# Patient Record
Sex: MALE | Race: ASIAN | NOT HISPANIC OR LATINO | URBAN - METROPOLITAN AREA
[De-identification: names, ages, dates, MRNs, and addresses within clinical notes are randomized per-mention and may not be internally consistent; named-entity substitution may affect disease eponyms.]

---

## 2023-03-02 ENCOUNTER — INPATIENT (INPATIENT)
Facility: HOSPITAL | Age: 63
LOS: 1 days | Discharge: HOME | DRG: 64 | End: 2023-03-04
Attending: PSYCHIATRY & NEUROLOGY | Admitting: PSYCHIATRY & NEUROLOGY
Payer: COMMERCIAL

## 2023-03-02 VITALS
RESPIRATION RATE: 18 BRPM | TEMPERATURE: 98 F | OXYGEN SATURATION: 100 % | DIASTOLIC BLOOD PRESSURE: 101 MMHG | SYSTOLIC BLOOD PRESSURE: 181 MMHG | HEART RATE: 82 BPM

## 2023-03-02 DIAGNOSIS — I61.9 NONTRAUMATIC INTRACEREBRAL HEMORRHAGE, UNSPECIFIED: ICD-10-CM

## 2023-03-02 DIAGNOSIS — I63.9 CEREBRAL INFARCTION, UNSPECIFIED: ICD-10-CM

## 2023-03-02 DIAGNOSIS — E78.5 HYPERLIPIDEMIA, UNSPECIFIED: ICD-10-CM

## 2023-03-02 DIAGNOSIS — I10 ESSENTIAL (PRIMARY) HYPERTENSION: ICD-10-CM

## 2023-03-02 DIAGNOSIS — C64.9 MALIGNANT NEOPLASM OF UNSPECIFIED KIDNEY, EXCEPT RENAL PELVIS: ICD-10-CM

## 2023-03-02 DIAGNOSIS — Z90.5 ACQUIRED ABSENCE OF KIDNEY: Chronic | ICD-10-CM

## 2023-03-02 LAB
A1C WITH ESTIMATED AVERAGE GLUCOSE RESULT: 5.6 % — SIGNIFICANT CHANGE UP (ref 4–5.6)
ALBUMIN SERPL ELPH-MCNC: 4.7 G/DL — SIGNIFICANT CHANGE UP (ref 3.3–5)
ALP SERPL-CCNC: 65 U/L — SIGNIFICANT CHANGE UP (ref 40–120)
ALT FLD-CCNC: 19 U/L — SIGNIFICANT CHANGE UP (ref 10–45)
ANION GAP SERPL CALC-SCNC: 14 MMOL/L — SIGNIFICANT CHANGE UP (ref 5–17)
ANION GAP SERPL CALC-SCNC: 15 MMOL/L — SIGNIFICANT CHANGE UP (ref 5–17)
APTT BLD: 35.2 SEC — SIGNIFICANT CHANGE UP (ref 27.5–35.5)
AST SERPL-CCNC: 21 U/L — SIGNIFICANT CHANGE UP (ref 10–40)
BASOPHILS # BLD AUTO: 0.02 K/UL — SIGNIFICANT CHANGE UP (ref 0–0.2)
BASOPHILS NFR BLD AUTO: 0.4 % — SIGNIFICANT CHANGE UP (ref 0–2)
BILIRUB SERPL-MCNC: 0.6 MG/DL — SIGNIFICANT CHANGE UP (ref 0.2–1.2)
BUN SERPL-MCNC: 23 MG/DL — SIGNIFICANT CHANGE UP (ref 7–23)
BUN SERPL-MCNC: 27 MG/DL — HIGH (ref 7–23)
CALCIUM SERPL-MCNC: 9.2 MG/DL — SIGNIFICANT CHANGE UP (ref 8.4–10.5)
CALCIUM SERPL-MCNC: 9.7 MG/DL — SIGNIFICANT CHANGE UP (ref 8.4–10.5)
CHLORIDE SERPL-SCNC: 106 MMOL/L — SIGNIFICANT CHANGE UP (ref 96–108)
CHLORIDE SERPL-SCNC: 107 MMOL/L — SIGNIFICANT CHANGE UP (ref 96–108)
CHOLEST SERPL-MCNC: 130 MG/DL — SIGNIFICANT CHANGE UP
CO2 SERPL-SCNC: 18 MMOL/L — LOW (ref 22–31)
CO2 SERPL-SCNC: 22 MMOL/L — SIGNIFICANT CHANGE UP (ref 22–31)
CREAT SERPL-MCNC: 1.3 MG/DL — SIGNIFICANT CHANGE UP (ref 0.5–1.3)
CREAT SERPL-MCNC: 1.49 MG/DL — HIGH (ref 0.5–1.3)
CRP SERPL-MCNC: <3 MG/L — SIGNIFICANT CHANGE UP (ref 0–4)
EGFR: 53 ML/MIN/1.73M2 — LOW
EGFR: 62 ML/MIN/1.73M2 — SIGNIFICANT CHANGE UP
EOSINOPHIL # BLD AUTO: 0.13 K/UL — SIGNIFICANT CHANGE UP (ref 0–0.5)
EOSINOPHIL NFR BLD AUTO: 2.8 % — SIGNIFICANT CHANGE UP (ref 0–6)
ERYTHROCYTE [SEDIMENTATION RATE] IN BLOOD: 9 MM/HR — SIGNIFICANT CHANGE UP (ref 0–20)
ESTIMATED AVERAGE GLUCOSE: 114 MG/DL — SIGNIFICANT CHANGE UP (ref 68–114)
GLUCOSE SERPL-MCNC: 110 MG/DL — HIGH (ref 70–99)
GLUCOSE SERPL-MCNC: 97 MG/DL — SIGNIFICANT CHANGE UP (ref 70–99)
HCT VFR BLD CALC: 42.4 % — SIGNIFICANT CHANGE UP (ref 39–50)
HCT VFR BLD CALC: 44.6 % — SIGNIFICANT CHANGE UP (ref 39–50)
HDLC SERPL-MCNC: 52 MG/DL — SIGNIFICANT CHANGE UP
HGB BLD-MCNC: 13.6 G/DL — SIGNIFICANT CHANGE UP (ref 13–17)
HGB BLD-MCNC: 14.3 G/DL — SIGNIFICANT CHANGE UP (ref 13–17)
IMM GRANULOCYTES NFR BLD AUTO: 0.2 % — SIGNIFICANT CHANGE UP (ref 0–0.9)
INR BLD: 0.95 RATIO — SIGNIFICANT CHANGE UP (ref 0.88–1.16)
LIPID PNL WITH DIRECT LDL SERPL: 66 MG/DL — SIGNIFICANT CHANGE UP
LYMPHOCYTES # BLD AUTO: 2.07 K/UL — SIGNIFICANT CHANGE UP (ref 1–3.3)
LYMPHOCYTES # BLD AUTO: 43.9 % — SIGNIFICANT CHANGE UP (ref 13–44)
MCHC RBC-ENTMCNC: 30.5 PG — SIGNIFICANT CHANGE UP (ref 27–34)
MCHC RBC-ENTMCNC: 30.8 PG — SIGNIFICANT CHANGE UP (ref 27–34)
MCHC RBC-ENTMCNC: 32.1 GM/DL — SIGNIFICANT CHANGE UP (ref 32–36)
MCHC RBC-ENTMCNC: 32.1 GM/DL — SIGNIFICANT CHANGE UP (ref 32–36)
MCV RBC AUTO: 95.1 FL — SIGNIFICANT CHANGE UP (ref 80–100)
MCV RBC AUTO: 95.9 FL — SIGNIFICANT CHANGE UP (ref 80–100)
MONOCYTES # BLD AUTO: 0.45 K/UL — SIGNIFICANT CHANGE UP (ref 0–0.9)
MONOCYTES NFR BLD AUTO: 9.5 % — SIGNIFICANT CHANGE UP (ref 2–14)
NEUTROPHILS # BLD AUTO: 2.04 K/UL — SIGNIFICANT CHANGE UP (ref 1.8–7.4)
NEUTROPHILS NFR BLD AUTO: 43.2 % — SIGNIFICANT CHANGE UP (ref 43–77)
NON HDL CHOLESTEROL: 77 MG/DL — SIGNIFICANT CHANGE UP
NRBC # BLD: 0 /100 WBCS — SIGNIFICANT CHANGE UP (ref 0–0)
NRBC # BLD: 0 /100 WBCS — SIGNIFICANT CHANGE UP (ref 0–0)
PLATELET # BLD AUTO: 171 K/UL — SIGNIFICANT CHANGE UP (ref 150–400)
PLATELET # BLD AUTO: 185 K/UL — SIGNIFICANT CHANGE UP (ref 150–400)
POTASSIUM SERPL-MCNC: 3.7 MMOL/L — SIGNIFICANT CHANGE UP (ref 3.5–5.3)
POTASSIUM SERPL-MCNC: 4.1 MMOL/L — SIGNIFICANT CHANGE UP (ref 3.5–5.3)
POTASSIUM SERPL-SCNC: 3.7 MMOL/L — SIGNIFICANT CHANGE UP (ref 3.5–5.3)
POTASSIUM SERPL-SCNC: 4.1 MMOL/L — SIGNIFICANT CHANGE UP (ref 3.5–5.3)
PROT SERPL-MCNC: 7.5 G/DL — SIGNIFICANT CHANGE UP (ref 6–8.3)
PROTHROM AB SERPL-ACNC: 11 SEC — SIGNIFICANT CHANGE UP (ref 10.5–13.4)
RBC # BLD: 4.42 M/UL — SIGNIFICANT CHANGE UP (ref 4.2–5.8)
RBC # BLD: 4.69 M/UL — SIGNIFICANT CHANGE UP (ref 4.2–5.8)
RBC # FLD: 13 % — SIGNIFICANT CHANGE UP (ref 10.3–14.5)
RBC # FLD: 13.2 % — SIGNIFICANT CHANGE UP (ref 10.3–14.5)
SARS-COV-2 RNA SPEC QL NAA+PROBE: SIGNIFICANT CHANGE UP
SODIUM SERPL-SCNC: 140 MMOL/L — SIGNIFICANT CHANGE UP (ref 135–145)
SODIUM SERPL-SCNC: 142 MMOL/L — SIGNIFICANT CHANGE UP (ref 135–145)
TRIGL SERPL-MCNC: 55 MG/DL — SIGNIFICANT CHANGE UP
TROPONIN T, HIGH SENSITIVITY RESULT: 23 NG/L — SIGNIFICANT CHANGE UP (ref 0–51)
WBC # BLD: 4.72 K/UL — SIGNIFICANT CHANGE UP (ref 3.8–10.5)
WBC # BLD: 6.33 K/UL — SIGNIFICANT CHANGE UP (ref 3.8–10.5)
WBC # FLD AUTO: 4.72 K/UL — SIGNIFICANT CHANGE UP (ref 3.8–10.5)
WBC # FLD AUTO: 6.33 K/UL — SIGNIFICANT CHANGE UP (ref 3.8–10.5)

## 2023-03-02 PROCEDURE — 70496 CT ANGIOGRAPHY HEAD: CPT | Mod: 26,MA

## 2023-03-02 PROCEDURE — 70553 MRI BRAIN STEM W/O & W/DYE: CPT | Mod: 26

## 2023-03-02 PROCEDURE — 99223 1ST HOSP IP/OBS HIGH 75: CPT

## 2023-03-02 PROCEDURE — 70450 CT HEAD/BRAIN W/O DYE: CPT | Mod: 26

## 2023-03-02 PROCEDURE — 0042T: CPT | Mod: MA

## 2023-03-02 PROCEDURE — 99291 CRITICAL CARE FIRST HOUR: CPT

## 2023-03-02 PROCEDURE — 70498 CT ANGIOGRAPHY NECK: CPT | Mod: 26,MA

## 2023-03-02 RX ORDER — SODIUM CHLORIDE 9 MG/ML
1000 INJECTION, SOLUTION INTRAVENOUS ONCE
Refills: 0 | Status: COMPLETED | OUTPATIENT
Start: 2023-03-02 | End: 2023-03-02

## 2023-03-02 RX ORDER — INFLUENZA VIRUS VACCINE 15; 15; 15; 15 UG/.5ML; UG/.5ML; UG/.5ML; UG/.5ML
0.5 SUSPENSION INTRAMUSCULAR ONCE
Refills: 0 | Status: DISCONTINUED | OUTPATIENT
Start: 2023-03-02 | End: 2023-03-04

## 2023-03-02 RX ORDER — AMLODIPINE BESYLATE 2.5 MG/1
2.5 TABLET ORAL DAILY
Refills: 0 | Status: DISCONTINUED | OUTPATIENT
Start: 2023-03-02 | End: 2023-03-04

## 2023-03-02 RX ORDER — NICARDIPINE HYDROCHLORIDE 30 MG/1
5 CAPSULE, EXTENDED RELEASE ORAL
Qty: 40 | Refills: 0 | Status: DISCONTINUED | OUTPATIENT
Start: 2023-03-02 | End: 2023-03-02

## 2023-03-02 RX ORDER — ATORVASTATIN CALCIUM 80 MG/1
10 TABLET, FILM COATED ORAL AT BEDTIME
Refills: 0 | Status: DISCONTINUED | OUTPATIENT
Start: 2023-03-02 | End: 2023-03-04

## 2023-03-02 RX ADMIN — NICARDIPINE HYDROCHLORIDE 25 MG/HR: 30 CAPSULE, EXTENDED RELEASE ORAL at 02:39

## 2023-03-02 RX ADMIN — AMLODIPINE BESYLATE 2.5 MILLIGRAM(S): 2.5 TABLET ORAL at 10:19

## 2023-03-02 RX ADMIN — SODIUM CHLORIDE 1000 MILLILITER(S): 9 INJECTION, SOLUTION INTRAVENOUS at 03:01

## 2023-03-02 RX ADMIN — ATORVASTATIN CALCIUM 10 MILLIGRAM(S): 80 TABLET, FILM COATED ORAL at 21:25

## 2023-03-02 RX ADMIN — NICARDIPINE HYDROCHLORIDE 25 MG/HR: 30 CAPSULE, EXTENDED RELEASE ORAL at 04:55

## 2023-03-02 NOTE — PHYSICAL THERAPY INITIAL EVALUATION ADULT - PLANNED THERAPY INTERVENTIONS, PT EVAL
GOAL: patient will be able to negotiate 10  stairs (I) with one HR in 2 weeks/balance training/gait training/strengthening/transfer training

## 2023-03-02 NOTE — PHYSICAL THERAPY INITIAL EVALUATION ADULT - PREDICTED DURATION OF THERAPY (DAYS/WKS), PT EVAL
2 weeks Duration Of Freeze Thaw-Cycle (Seconds): 0 Render Note In Bullet Format When Appropriate: No Consent: The patient's consent was obtained including but not limited to risks of crusting, scabbing, blistering, scarring, darker or lighter pigmentary change, recurrence, incomplete removal and infection. Detail Level: Detailed Post-Care Instructions: I reviewed with the patient in detail post-care instructions. Patient is to wear sunprotection, and avoid picking at any of the treated lesions. Pt may apply Vaseline to crusted or scabbing areas. Number Of Freeze-Thaw Cycles: 1 freeze-thaw cycle

## 2023-03-02 NOTE — OCCUPATIONAL THERAPY INITIAL EVALUATION ADULT - DIAGNOSIS, OT EVAL
Pt with mild deficits in dynamic standing balance impacting independence in higher level adl's Ftsg Text: The defect edges were debeveled with a #15 scalpel blade.  Given the location of the defect, shape of the defect and the proximity to free margins a full thickness skin graft was deemed most appropriate.  Using a sterile surgical marker, the primary defect shape was transferred to the donor site. The area thus outlined was incised deep to adipose tissue with a #15 scalpel blade.  The harvested graft was then trimmed of adipose tissue until only dermis and epidermis was left.  The skin margins of the secondary defect were undermined to an appropriate distance in all directions utilizing iris scissors.  The secondary defect was closed with interrupted buried subcutaneous sutures.  The skin edges were then re-apposed with running  sutures.  The skin graft was then placed in the primary defect and oriented appropriately.

## 2023-03-02 NOTE — PATIENT PROFILE ADULT - FUNCTIONAL ASSESSMENT - BASIC MOBILITY 6.
4-calculated by average/Not able to assess (calculate score using [facility] averaging method)

## 2023-03-02 NOTE — ED PROVIDER NOTE - OBJECTIVE STATEMENT
62-year-old male with a past medical history of prior stroke, HTN presents to the ED with acute numbness and facial droop that started proximately midnight.  Patient did not go to sleep and had symptoms started while he was awake.  He endorses weakness in the left lower extremity and he states that his left lower leg "feels heavier "compared to the other side.  He denies any headaches, fevers, chills, nausea, vomiting, diarrhea.  Usual state of health prior to onset of symptoms.  He takes aspirin and atorvastatin daily.  Code stroke was called from the triage area and immediately evaluated at CT scanner

## 2023-03-02 NOTE — ED PROVIDER NOTE - PHYSICAL EXAMINATION
GENERAL: no acute distress, non-toxic appearing  HEAD: normocephalic, atraumatic  HEENT: normal conjunctiva, oral mucosa moist, neck supple  CARDIAC: regular rate and rhythm, normal S1 and S2,  no appreciable murmurs  PULM: clear to ascultation bilaterally, no crackles, rales, rhonchi, or wheezing  GI: abdomen nondistended, soft, nontender, no guarding or rebound tenderness  : no CVA tenderness, no suprapubic tenderness    NEURO: alert and oriented x 3, normal speech, PERRLA, EOMI, no focal motor, nonantalgic gait, Right-sided nasolabial fold flattening, left-sided upper and lower extremity generalized numbness    MSK: no visible deformities, no peripheral edema, calf tenderness/redness/swelling  SKIN: no visible rashes, dry, well-perfused  PSYCH: appropriate mood and affect

## 2023-03-02 NOTE — ED PROVIDER NOTE - ATTENDING CONTRIBUTION TO CARE
MD Reinoso:  patient seen and evaluated personally.   I agree with the History & Physical,  Impression & Plan other than what was detailed in my note.  MD Reinoso  hx obtained via pt and daughter with resident and daughter translating, attempted to get  for mandarin as well  63 y/o male hx of stroke, no reported deficitis, lnk was t 12 am, at that time developed acute onset numbness on left face/arm/leg, states also feels weakness mildly, no cp, sob, f/c, no ha, bv, bg wnl, vitals stable, pt called stroke alert evaluated at ct scan, pt w/ no focal deficits, no facial droop, axox3, power 5/5x 4, no cn deficits, no pronator drift, given report of symptoms ct cta w/ perfusion ordered, will re eval, appreciate neuro recs. pt currently well appearing in no distress. MD Reinoso:  patient seen and evaluated personally.   I agree with the History & Physical,  Impression & Plan other than what was detailed in my note.  MD Reinoso  hx obtained via pt and daughter with resident and daughter translating, attempted to get  for mandarin as well  61 y/o male hx of stroke, no reported deficitis, lnk was t 12 am, at that time developed acute onset numbness on left face/arm/leg, states also feels weakness mildly, no cp, sob, f/c, no ha, bv, bg wnl, vitals stable, pt called stroke alert evaluated at ct scan, pt w/ no focal deficits, no facial droop, maybe some slight nl fold on r,  axox3, power 5/5x 4, no other cn deficits, no pronator drift, given report of symptoms ct cta w/ perfusion ordered, will re eval, appreciate neuro recs. pt currently well appearing in no distress.

## 2023-03-02 NOTE — PHYSICAL THERAPY INITIAL EVALUATION ADULT - PERTINENT HX OF CURRENT PROBLEM, REHAB EVAL
61 y/o RH male PMHx kidney cancer (now has one kidney), prior stroke (unknown residual deficits), HTN presenting with left temporal, LUE, LLE numbness and heaviness, LKW 3/2/23 00;00. Sx started abruptly. Patient denies HA, vision changes, dizziness. Patient visiting from [place]. Takes ASA 81 mg daily and Lipitor 10 mg daily. CT brain stroke protocol 01/02/23 Hyperdensity within the right posterior limb of the internal capsule suggestive of acute intraparenchymal hemorrhage. No significant mass   effect or midline shift. Punctate faint hyperattenuation in the anterior frontal region may   reflect tiny mineralization, minimal hemorrhage, or underlying lesion such as cavernous malformation. CT angiography neck 01/02/23: No hemodynamically significant stenosis by NASCET criteria. No vascular dissection. CT angiography brain: Right posterior cerebral artery markedly diminutive and irregular possibly reflecting vasospasm, vasculitis, or possible stenosis. Mild luminal irregularity of the left proximal posterior cerebral artery. Otherwise no major vessel occlusion or proximal stenosis. No evidence of aneurysm or other vascular malformation. CT perfusion 01/02/23: No core infarct or at risk territory.

## 2023-03-02 NOTE — H&P ADULT - TIME BILLING
I had an extensive conversation with patient and son via  Mandarin.  In summary patient has history of renal cancer in remission after surgery about 5 years ago. He was told that there are some nodules that are inflammatory but they have not changed and the cancer hasn't spread. He has history of 2 prior ischemic strokes one what seems retinal artery occlusion that left him blind in the right eye and another one that he can not recall the symptoms but his doctor in [place] had explained to them that after the surgery he was placed on a medication for his cancer and that the medication might contribute to a hypercoagulable state which was deemed etiology of the stroke. Patient is currently on aspirin and atorvastatin.   Now he presents with left side numbness and feeling heaviness of the left leg. The CT of the brain reveals a small thalamocapsular hemorrrhage.   Etiology -Hypertension: important to clarify that patient has no history of long standing high blood pressure, for which less likely in this setting.   Underlying brain lesion as cavernoma or  mass-will evaluate with MR of the brain  Vascular arteriovenous shunting unlikely and not evidence in CT angiogram for which will consider cerebral angiography.

## 2023-03-02 NOTE — OCCUPATIONAL THERAPY INITIAL EVALUATION ADULT - ADDITIONAL COMMENTS
Pt lives with spouse in [place]. Here visiting his son. will be returning to his sons house 2ste, 1 fos to bedroom, +stall shower, PTA pt was independent in all adl's and functional mobility.

## 2023-03-02 NOTE — ED PROVIDER NOTE - CLINICAL SUMMARY MEDICAL DECISION MAKING FREE TEXT BOX
62-year-old male with a past medical history of prior stroke, HTN presents to the ED with acute numbness and facial droop that started proximately midnight. Initial vitals notable for hypertension, rest of vitals otherwise nonactionable.  Breathing comfortably at bedside.  Patient seen ambulating at bedside without difficulty.  Patient is endorsing left sided sensory deficits throughout the left upper and left lower extremities.  There is a right-sided find nasolabial fold.  Differential diagnosis includes but not limited to embolic/thrombotic stroke versus hemorrhagic stroke.  Neurology at bedside evaluating patient.  Will order labs, EKG, meds, imaging, and reassess.

## 2023-03-02 NOTE — H&P ADULT - ASSESSMENT
63 y/o RH male PMHx kidney cancer (now has one kidney), prior stroke (unknown residual deficits), HTN presenting with left temporal, LUE, LLE numbness and heaviness, LKW 3/2/23 00;00. Sx started abruptly. Patient denies HA, vision changes, dizziness. Patient visiting from [place]. Takes ASA 81 mg daily and Lipitor 10 mg daily.     NIHSS:2, for mild facial asymmetry and sensory deficits  premrs;0  not a tenecteplase candidate given hemorrhage on CTH.  not a thrombectomy given no LVO.     Impression: Left sided sensorimotor deficits 2/2 right PLIC IPH likely iso HTN.     Recommendations:  [] NPO unless passess dysphagia screen; otherwise, S/S eval.   [] MRI brain w/w/o contrast   [] Repeat CTH in 4H and 24H (unless MRI brain done instead)   [] BP Goal: <140/90   [] Mechanical DVT ppx; can start pharmacologic DVT ppx in 24 hours if scans stable  [] Hold all antiplatelets and anticoagulants; Time to resume dependent on repeat imaging   [] Avoid hyponatremia; goal Na 140-150; No need for hypertonic saline or osmotic agents at this time   [] PT/OT    Case d/w stroke fellow under supervision of stroke attending.  63 y/o RH male PMHx kidney cancer (now has one kidney), prior stroke (unknown residual deficits), HTN presenting with left temporal, LUE, LLE numbness and heaviness, LKW 3/2/23 00;00. Sx started abruptly. Patient denies HA, vision changes, dizziness. Patient visiting from [place]. Takes ASA 81 mg daily and Lipitor 10 mg daily.     NIHSS:2, for mild facial asymmetry and sensory deficits  premrs;0  ICH:0  not a tenecteplase candidate given hemorrhage on CTH.  not a thrombectomy given no LVO.     Impression: Left sided sensorimotor deficits 2/2 right PLIC IPH likely iso HTN.     Recommendations:  [] NPO unless passess dysphagia screen; otherwise, S/S eval.   [] MRI brain w/w/o contrast   [] Repeat CTH in 4H and 24H (unless MRI brain done instead)   [] BP Goal: <140/90   [] Mechanical DVT ppx; can start pharmacologic DVT ppx in 24 hours if scans stable  [] Hold all antiplatelets and anticoagulants; Time to resume dependent on repeat imaging   [] Avoid hyponatremia; goal Na 140-150; No need for hypertonic saline or osmotic agents at this time   [] PT/OT    Case d/w stroke fellow under supervision of stroke attending.  63 y/o RH male PMHx kidney cancer (now has one kidney), prior stroke (unknown residual deficits), HTN presenting with left temporal, LUE, LLE numbness and heaviness, LKW 3/2/23 00;00. Sx started abruptly. Patient denies HA, vision changes, dizziness. Patient visiting from [place]. Takes ASA 81 mg daily and Lipitor 10 mg daily.     NIHSS:2, for mild facial asymmetry and sensory deficits  premrs;0  ICH:0  not a tenecteplase candidate given hemorrhage on CTH.  not a thrombectomy given no LVO.     Impression: Left sided sensorimotor deficits 2/2 right PLIC/thalamic IPH likely iso HTN.     Recommendations:  [] NPO unless passess dysphagia screen; otherwise, S/S eval.   [] MRI brain w/w/o contrast   [] Repeat CTH in 4H and 24H (unless MRI brain done instead)   [] BP Goal: <140/90   [] Mechanical DVT ppx; can start pharmacologic DVT ppx in 24 hours if scans stable  [] Hold all antiplatelets and anticoagulants; Time to resume dependent on repeat imaging   [] Avoid hyponatremia; goal Na 140-150; No need for hypertonic saline or osmotic agents at this time   [] PT/OT    Case d/w stroke fellow under supervision of stroke attending.  63 y/o RH male PMHx kidney cancer (now has one kidney), prior stroke (unknown residual deficits), HTN presenting with left temporal, LUE, LLE numbness and heaviness, LKW 3/2/23 00;00. Sx started abruptly. Patient denies HA, vision changes, dizziness. Patient visiting from [place]. Takes ASA 81 mg daily and Lipitor 10 mg daily.     NIHSS:2  premrs;0  ICH:0  not a tenecteplase candidate given hemorrhage on CTH.  not a thrombectomy given no LVO.     Impression: Left sided sensorimotor deficits 2/2 right PLIC/thalamic IPH likely iso HTN.     Recommendations:  [] NPO unless passess dysphagia screen; otherwise, S/S eval.   [] MRI brain w/w/o contrast   [] Repeat CTH in 4H and 24H (unless MRI brain done instead)   [] BP Goal: <140/90   [] Mechanical DVT ppx; can start pharmacologic DVT ppx in 24 hours if scans stable  [] Hold all antiplatelets and anticoagulants; Time to resume dependent on repeat imaging   [] Avoid hyponatremia; goal Na 140-150; No need for hypertonic saline or osmotic agents at this time   [] PT/OT    Case d/w stroke fellow under supervision of stroke attending.

## 2023-03-02 NOTE — PHYSICAL THERAPY INITIAL EVALUATION ADULT - ACTIVE RANGE OF MOTION EXAMINATION, REHAB EVAL
marko. upper extremity Active ROM was WNL (within normal limits)/bilateral lower extremity Active ROM was WNL (within normal limits)

## 2023-03-02 NOTE — PHYSICAL THERAPY INITIAL EVALUATION ADULT - NSPTDISCHREC_GEN_A_CORE
Home with outpatient physical therapy increase strength, balance, and endurance to improve all functional mobility./Outpatient PT

## 2023-03-02 NOTE — PATIENT PROFILE ADULT - FALL HARM RISK - HARM RISK INTERVENTIONS

## 2023-03-02 NOTE — ED PROVIDER NOTE - PROGRESS NOTE DETAILS
Pt resting comfortably on the cart, no acute distress, VSS, call light within reach   Patient was revealed to have CKD and a solitary kidney.  Risk and benefits of CT contrast study were explained to the patient.  Given acute left-sided numbness and facial droop it would be more beneficial to obtain CT scans to evaluate for stroke then risk of contrast-induced nephropathy.  Risk benefits were explained to the patient and family member at bedside.  Agreed to continue with contrast study. Attending Masom:  pt has intraparenchymal bleed, neuro recommending admission to them, will repeat bp, recommending keep less than 140.

## 2023-03-02 NOTE — ED ADULT NURSE NOTE - NSFALLRSKASSESASSIST_ED_ALL_ED
Lt message appt on 12/17/20 will need to be donna to 12/21/20 at 11am per . advised to contact office if he had any questions. Bri   
no

## 2023-03-02 NOTE — PATIENT PROFILE ADULT - DOES PATIENT HAVE ADVANCE DIRECTIVE
Expiration Date For Kenalog (Optional): 05/2023 Total Volume (Ccs): 0.6 Include Z78.9 (Other Specified Conditions Influencing Health Status) As An Associated Diagnosis?: No Lot # For Kenalog (Optional): [id number] No Concentration Of Kenalog Solution Injected (Mg/Ml): 20.0 Consent: The risks of atrophy were reviewed with the patient. Medical Necessity Clause: This procedure was medically necessary because the lesions that were treated were: Validate Note Data When Using Inventory: Yes Detail Level: Detailed X Size Of Lesion In Cm (Optional): 0 Kenalog Preparation: Kenalog with 2% lidocaine without epinephrine Treatment Number (Optional): 2 Administered By (Optional): Georgia Chamorro PA-C

## 2023-03-02 NOTE — ED ADULT TRIAGE NOTE - CHIEF COMPLAINT QUOTE
L. sided UE/LE numbness starting at midnight, Mandarin speaking daughter at bedside L. sided UE/LE and face numbness starting at midnight, Mandarin speaking daughter at bedside

## 2023-03-02 NOTE — H&P ADULT - NSHPPHYSICALEXAM_GEN_ALL_CORE
Neurological Exam:  Mental Status: Orientated to self, date and place.  Attention intact.  No dysarthria. Speech fluent.  Cranial Nerves:   PERRL, EOMI, VFF, no nystagmus.    CN V1-3 intact to light touch .  No facial asymmetry.  Hearing intact to finger rub bilaterally.  Tongue, uvula and palate midline.  Sternocleidomastoid and Trapezius intact bilaterally.    Motor:   Tone: normal.                  Strength:     [] Upper extremity                      Delt       Bicep    Tricep                                                  R         5/5 5/5 5/5 5/5                                               L          5/5 5/5 5/5 5/5  [] Lower extremity                       HF          KE          KF        DF         PF                                               R        5/5 5/5 5/5 5/5 5/5                                               L         4/5 4/5 4/5 5/5 5/5  Pronator drift: none                 Dysmetria: None to finger-nose-finger   No truncal ataxia.    Tremor: No resting, postural or action tremor.  No myoclonus.    Sensation: decreased to light touch and pinprick in LUE and LLE.     Gait: Drags left foot Neurological Exam:  Mental Status: Orientated to self, date and place.  Attention intact.  No dysarthria. Speech fluent.  Cranial Nerves:   PERRL, EOMI, VFF except baseline blindness in right eye, no nystagmus.    CN V1-3 intact to light touch .  No facial asymmetry.  Hearing intact to finger rub bilaterally.  Tongue, uvula and palate midline.  Sternocleidomastoid and Trapezius intact bilaterally.    Motor:   Tone: normal.                  Strength:     [] Upper extremity                      Delt       Bicep    Tricep                                                  R         5/5 5/5 5/5 5/5                                               L          5/5 5/5 5/5 5/5  [] Lower extremity                       HF          KE          KF        DF         PF                                               R        5/5 5/5 5/5 5/5 5/5                                               L         4/5 4/5 4/5 5/5        5/5  Pronator drift: none                 Dysmetria: None to finger-nose-finger   No truncal ataxia.    Tremor: No resting, postural or action tremor.  No myoclonus.    Sensation: decreased to light touch and pinprick in LUE and LLE.     Gait: Drags left foot

## 2023-03-02 NOTE — H&P ADULT - NSHPLABSRESULTS_GEN_ALL_CORE
< from: CT Brain Stroke Protocol (03.02.23 @ 01:51) >    Hyperdensity within the right posterior limb of the internal capsule   suggestive of acute intraparenchymal hemorrhage. No significant mass   effect or midline shift.    Punctate faint hyperattenuation in the anterior frontal region may   reflect tiny mineralization, minimal hemorrhage, or underlying lesion   such as cavernous malformation.    < end of copied text >    < from: CT Brain Perfusion Maps Stroke (03.02.23 @ 01:58) >    CT angiography neck: No hemodynamically significant stenosis by NASCET   criteria. No vascular dissection.    CT angiography brain: Right posterior cerebral artery markedly diminutive   and irregular possibly reflecting vasospasm, vasculitis, or possible   stenosis. Mild luminal irregularity of the left proximal posterior   cerebral artery. Otherwise no major vessel occlusion or proximal   stenosis. No evidence of aneurysm or other vascular malformation.    CT perfusion: No core infarct or at risk territory.    < end of copied text >

## 2023-03-02 NOTE — CONSULT NOTE ADULT - PROBLEM SELECTOR RECOMMENDATION 2
BP goal <140/90  s/p aav gtt - d/c'd this am   start amlodipine 5mg PO daily when needed  continue to monitor

## 2023-03-02 NOTE — H&P ADULT - HISTORY OF PRESENT ILLNESS
63 y/o RH male PMHx kidney cancer (now has one kidney), prior stroke (unknown residual deficits), HTN presenting with left temporal, LUE, LLE numbness and heaviness, LKW 3/2/23 00;00. Sx started abruptly. Patient denies HA, vision changes, dizziness. Patient visiting from [place]. Takes ASA 81 mg daily and Lipitor 10 mg daily.     NIHSS:2, for mild facial asymmetry and sensory deficits  premrs;0  not a tenecteplase candidate given hemorrhage on CTH.  not a thrombectomy given no LVO.  63 y/o RH male PMHx kidney cancer (now has one kidney), prior stroke (unknown residual deficits), HTN presenting with left temporal, LUE, LLE numbness and heaviness, LKW 3/2/23 00;00. Sx started abruptly. Patient denies HA, vision changes, dizziness. Patient visiting from [place]. Takes ASA 81 mg daily and Lipitor 10 mg daily.     NIHSS:2, for mild facial asymmetry and sensory deficits  premrs;0  ICH:0  not a tenecteplase candidate given hemorrhage on CTH.  not a thrombectomy given no LVO.  63 y/o RH male PMHx kidney cancer (now has one kidney), prior stroke (unknown residual deficits), HTN presenting with left temporal, LUE, LLE numbness and heaviness, LKW 3/2/23 00;00. Sx started abruptly. Patient denies HA, vision changes, dizziness. Patient visiting from [place]. Takes ASA 81 mg daily and Lipitor 10 mg daily.     NIHSS:3, for mild facial asymmetry and sensory deficits and baseline right visual difficulty  premrs;0  ICH:0  not a tenecteplase candidate given hemorrhage on CTH.  not a thrombectomy given no LVO.  63 y/o RH male PMHx kidney cancer (now has one kidney), prior stroke (unknown residual deficits), HTN presenting with left temporal, LUE, LLE numbness and heaviness, LKW 3/2/23 00;00. Sx started abruptly. Patient denies HA, vision changes, dizziness. Patient visiting from [place]. Takes ASA 81 mg daily and Lipitor 10 mg daily.     NIHSS:3  premrs;0  ICH:0  not a tenecteplase candidate given hemorrhage on CTH.  not a thrombectomy given no LVO.

## 2023-03-02 NOTE — PHYSICAL THERAPY INITIAL EVALUATION ADULT - ADDITIONAL COMMENTS
Pt. has been visiting from [place] for the past 2 months and will be staying with his son, daughter in law, their two children and his wife in a 2 story PH with 2 JUANIS and 1 flight to the bedroom. Pt. sons stated he will be staying with them until June. Son stated he has significant family support at home to assist. Patients son stated he was independent in all ADLs/IADLs prior to admission, +drive occasionally.

## 2023-03-02 NOTE — ED ADULT NURSE NOTE - OBJECTIVE STATEMENT
62y M A&Ox4 c/o left sided weakness. Code stroke activated at 0124am. Pt primary language Mandarin and using  Chann #[id number]. Pt states that at 1210 am, pt had a sudden onset of Left sided facial, arm and leg numbness. Upon assessment pt describes numbness that is on th left side of face and traveling down to his left leg. Pt does not have any limb drift, slurred speech, or weakness. Small droop to lip noted. Pt also notes that he has had a stroke in 2019 in [place] but is unsure if there was any residual deficits. Denies any Blurry vision/dizziness/n/v/d/cp/sob/gi/gu symptoms. PMH of Kidney Ca, Loss of hearing to right side and vision loss to right eye as a complication medications s/p kidney tumor. PSH of left kidney removal.

## 2023-03-02 NOTE — STUDENT SIGN OFF DOCUMENT - STUDENT DOCUMENT REVIEW
Protocol For Bath Puva: The patient received Bath PUVA. Candice Brody, SPT. Protocol For Nbuvb: Hands/Feet: The patient received NBUVB. Protocol For Photochemotherapy For Severe Photoresponsive Dermatoses: Tar And Broad Band Uvb (Goeckerman Treatment): The patient received Photochemotherapy for severe photoresponsive dermatoses: Tar and Broad Band UVB (Goeckerman treatment) requiring at least 4 to 8 hours of care under direct physician supervision. Protocol For Photochemotherapy For Severe Photoresponsive Dermatoses: Petrolatum And Nbuvb: The patient received Photochemotherapy for severe photoresponsive dermatoses: Petrolatum and NBUVB requiring at least 4 to 8 hours of care under direct physician supervision. Protocol: Photochemotherapy: Baby Oil and NBUVB Protocol For Broad Band Uvb: The patient received Broad Band UVB. Comments On Previous Treatment: Increase by 20mj per treatment follow up with Dr Amarjit Carballo in 3 months max dose 875mj Protocol For Protocol For Photochemotherapy For Severe Photoresponsive Dermatoses: Bath Puva: The patient received Photochemotherapy for severe photoresponsive dermatoses: Bath PUVA requiring at least 4 to 8 hours of care under direct physician supervision. Post-Care Instructions: I reviewed with the patient in detail post-care instructions. Patient is to wear sun protection. Patients may expect sunburn like redness, discomfort and scabbing. Total Treatment Time: 1:21 Protocol For Photochemotherapy: Tar And Nbuvb (Goeckerman Treatment): The patient received Photochemotherapy: Tar and NBUVB (Goeckerman treatment). Irradiance (Optional- Include Units): 3.2 Protocol For Photochemotherapy: Mineral Oil And Nbuvb: The patient received Photochemotherapy: Mineral Oil and NBUVB (mineral oil applied to all lesions prior to phototherapy). Protocol For Photochemotherapy: Tar And Broad Band Uvb (Goeckerman Treatment): The patient received Photochemotherapy: Tar and Broad Band UVB (Goeckerman treatment). Protocol For Nb Uva: The patient received NB UVA. Protocol For Photochemotherapy For Severe Photoresponsive Dermatoses: Petrolatum And Broad Band Uvb: The patient received Photochemotherapyfor severe photoresponsive dermatoses: Petrolatum and Broad Band UVB requiring at least 4 to 8 hours of care under direct physician supervision. Protocol For Photochemotherapy: Petrolatum And Broad Band Uvb: The patient received Photochemotherapy: Petrolatum and Broad Band UVB. Protocol For Photochemotherapy: Petrolatum And Nbuvb: The patient received Photochemotherapy: Petrolatum and NBUVB (petrolatum applied to all lesions prior to phototherapy). Protocol For Puva: The patient received PUVA. Name Of Supervising Technician: Raina Perera MA Consent: The risks were reviewed with the patient including but not limited to: burn, pigmentary changes, pain, blistering, scabbing, redness, increased risk of skin cancers, and the remote possibility of scarring. Protocol For Uva: The patient received UVA. Protocol For Photochemotherapy: Triamcinolone Ointment And Nbuvb: The patient received Photochemotherapy: Triamcinolone and NBUVB (triamcinolone ointment applied to all lesions prior to phototherapy). Skin Type: II Treatment Number: 4 Protocol For Photochemotherapy For Severe Photoresponsive Dermatoses: Puva: The patient received Photochemotherapy for severe photoresponsive dermatoses: PUVA requiring at least 4 to 8 hours of care under direct physician supervision. Detail Level: Generalized Render Post-Care In The Note: no Protocol For Photochemotherapy: Baby Oil And Nbuvb: The patient received Photochemotherapy: Baby Oil and NBUVB (baby oil applied to all lesions prior to phototherapy). Total Body Energy: [address] Protocol For Photochemotherapy For Severe Photoresponsive Dermatoses: Tar And Nbuvb (Goeckerman Treatment): The patient received Photochemotherapy for severe photoresponsive dermatoses: Tar and NBUVB (Goeckerman treatment) requiring at least 4 to 8 hours of care under direct physician supervision. Protocol For Uva1: The patient received UVA1. Protocol For Photochemotherapy: Mineral Oil And Broad Band Uvb: The patient received Photochemotherapy: Mineral Oil and Broad Band UVB.

## 2023-03-02 NOTE — CONSULT NOTE ADULT - PROBLEM SELECTOR RECOMMENDATION 9
right PLIC/thalamic IPH     - likely iso HTN  pending MRI H  check ECHO   monitor on tele   management as per Stroke Team

## 2023-03-03 ENCOUNTER — APPOINTMENT (OUTPATIENT)
Dept: NEUROSURGERY | Facility: HOSPITAL | Age: 63
End: 2023-03-03

## 2023-03-03 LAB
ANION GAP SERPL CALC-SCNC: 10 MMOL/L — SIGNIFICANT CHANGE UP (ref 5–17)
APTT BLD: 32.5 SEC — SIGNIFICANT CHANGE UP (ref 27.5–35.5)
BLD GP AB SCN SERPL QL: NEGATIVE — SIGNIFICANT CHANGE UP
BUN SERPL-MCNC: 27 MG/DL — HIGH (ref 7–23)
CALCIUM SERPL-MCNC: 9.2 MG/DL — SIGNIFICANT CHANGE UP (ref 8.4–10.5)
CHLORIDE SERPL-SCNC: 109 MMOL/L — HIGH (ref 96–108)
CO2 SERPL-SCNC: 22 MMOL/L — SIGNIFICANT CHANGE UP (ref 22–31)
CREAT SERPL-MCNC: 1.77 MG/DL — HIGH (ref 0.5–1.3)
EGFR: 43 ML/MIN/1.73M2 — LOW
GLUCOSE SERPL-MCNC: 89 MG/DL — SIGNIFICANT CHANGE UP (ref 70–99)
HCT VFR BLD CALC: 39.7 % — SIGNIFICANT CHANGE UP (ref 39–50)
HCV AB S/CO SERPL IA: 0.07 S/CO — SIGNIFICANT CHANGE UP (ref 0–0.99)
HCV AB SERPL-IMP: SIGNIFICANT CHANGE UP
HGB BLD-MCNC: 12.8 G/DL — LOW (ref 13–17)
INR BLD: 1.06 RATIO — SIGNIFICANT CHANGE UP (ref 0.88–1.16)
MCHC RBC-ENTMCNC: 30.9 PG — SIGNIFICANT CHANGE UP (ref 27–34)
MCHC RBC-ENTMCNC: 32.2 GM/DL — SIGNIFICANT CHANGE UP (ref 32–36)
MCV RBC AUTO: 95.9 FL — SIGNIFICANT CHANGE UP (ref 80–100)
NRBC # BLD: 0 /100 WBCS — SIGNIFICANT CHANGE UP (ref 0–0)
PLATELET # BLD AUTO: 167 K/UL — SIGNIFICANT CHANGE UP (ref 150–400)
POTASSIUM SERPL-MCNC: 3.9 MMOL/L — SIGNIFICANT CHANGE UP (ref 3.5–5.3)
POTASSIUM SERPL-SCNC: 3.9 MMOL/L — SIGNIFICANT CHANGE UP (ref 3.5–5.3)
PROTHROM AB SERPL-ACNC: 12.2 SEC — SIGNIFICANT CHANGE UP (ref 10.5–13.4)
RBC # BLD: 4.14 M/UL — LOW (ref 4.2–5.8)
RBC # FLD: 13.1 % — SIGNIFICANT CHANGE UP (ref 10.3–14.5)
RH IG SCN BLD-IMP: POSITIVE — SIGNIFICANT CHANGE UP
RH IG SCN BLD-IMP: POSITIVE — SIGNIFICANT CHANGE UP
SODIUM SERPL-SCNC: 141 MMOL/L — SIGNIFICANT CHANGE UP (ref 135–145)
WBC # BLD: 4.82 K/UL — SIGNIFICANT CHANGE UP (ref 3.8–10.5)
WBC # FLD AUTO: 4.82 K/UL — SIGNIFICANT CHANGE UP (ref 3.8–10.5)

## 2023-03-03 PROCEDURE — 36226 PLACE CATH VERTEBRAL ART: CPT | Mod: 50

## 2023-03-03 PROCEDURE — 99233 SBSQ HOSP IP/OBS HIGH 50: CPT

## 2023-03-03 PROCEDURE — 36227 PLACE CATH XTRNL CAROTID: CPT | Mod: 50

## 2023-03-03 PROCEDURE — 36224 PLACE CATH CAROTD ART: CPT | Mod: 50

## 2023-03-03 RX ORDER — SODIUM CHLORIDE 9 MG/ML
1000 INJECTION INTRAMUSCULAR; INTRAVENOUS; SUBCUTANEOUS
Refills: 0 | Status: DISCONTINUED | OUTPATIENT
Start: 2023-03-03 | End: 2023-03-04

## 2023-03-03 RX ADMIN — SODIUM CHLORIDE 75 MILLILITER(S): 9 INJECTION INTRAMUSCULAR; INTRAVENOUS; SUBCUTANEOUS at 21:44

## 2023-03-03 RX ADMIN — ATORVASTATIN CALCIUM 10 MILLIGRAM(S): 80 TABLET, FILM COATED ORAL at 21:43

## 2023-03-03 RX ADMIN — AMLODIPINE BESYLATE 2.5 MILLIGRAM(S): 2.5 TABLET ORAL at 06:24

## 2023-03-03 RX ADMIN — SODIUM CHLORIDE 75 MILLILITER(S): 9 INJECTION INTRAMUSCULAR; INTRAVENOUS; SUBCUTANEOUS at 12:30

## 2023-03-03 NOTE — CONSULT NOTE ADULT - ASSESSMENT
This is a 62 year old male with a history of renal cancer, s/p right nephrectomy in 2015 s/p 5 years of sofenib in remission who is admitted with left temporal, LUE, LLE numbness and heaviness, found to have right thalamocapsular parenchymal hemorrhage thought to be related to chronic HTN vs underlying malformation.      Renal cancer  He appeared to have Stage III disease, s/p nephrectomy in the [place], [place].   S/p adjuvant Sorafenib x 5 years, in remission.   His last CT scan was in 11/2022 which was negative for any progressive disease.   MRI of the brain did not reveal any evidence of metastatic disease.    Hold off on repeat imaging as his creatinine is 1.7, single kidney, multiple contrast load recently.   Will plan for outpatient follow up and PET/CT scan.      Discussed with neurology team along with the patient and his son who are in agreement.     Will follow.   Thank you.    Rosalina Miranda D.O.   [facility]  [phone]    
61 y/o RH male PMHx kidney cancer (now has one kidney), prior stroke (unknown residual deficits), HTN presenting with left temporal, LUE, LLE numbness and heaviness, LKW 3/2/23 00;00. Sx started abruptly. Patient denies HA, vision changes, dizziness. Patient visiting from [place]. Takes ASA 81 mg daily and Lipitor 10 mg daily.

## 2023-03-03 NOTE — SPEECH LANGUAGE PATHOLOGY EVALUATION - SLP DIAGNOSIS
Order received. Speech-language exam attempted, however, pt at cerebral angio. Will attempt to f/u at a later time pending pt availability. 63 y/o RH male PMHx kidney cancer, prior stroke, HTN presenting with left temporal, LUE, LLE numbness and heaviness, found to have small thalamocapsular hemorrhage. Pt p/w functional speech-language and cogntiive-linguistic skills. Pt able to follow all directives, make all medical/personal wants/needs known, with intact executive-functioning skills.

## 2023-03-03 NOTE — SPEECH LANGUAGE PATHOLOGY EVALUATION - SLP GENERAL OBSERVATIONS
Pt breathing comfortably on RA, Aox4, spouse and daughter-in-law present. All reporting no dysphagia or changes in ability to communicate. Session conducted in Mandarin w/ this Mandarin-English speaking clinician.

## 2023-03-03 NOTE — PROGRESS NOTE ADULT - SUBJECTIVE AND OBJECTIVE BOX
THE PATIENT WAS SEEN AND EXAMINED BY ME WITH THE HOUSESTAFF AND STROKE TEAM DURING MORNING ROUNDS.   HPI:  61 y/o RH male PMHx kidney cancer (now has one kidney), prior stroke (unknown residual deficits), HTN presenting with left temporal, LUE, LLE numbness and heaviness, LKW 3/2/23 00;00. Sx started abruptly. Patient denies HA, vision changes, dizziness. Patient visiting from [place]. Takes ASA 81 mg daily and Lipitor 10 mg daily.     NIHSS:3  premrs;0  ICH:0  not a tenecteplase candidate given hemorrhage on CTH.  not a thrombectomy given no LVO.  (02 Mar 2023 02:29)      SUBJECTIVE: No events overnight.  No new neurologic complaints.      amLODIPine   Tablet 2.5 milliGRAM(s) Oral daily  atorvastatin 10 milliGRAM(s) Oral at bedtime  influenza   Vaccine 0.5 milliLiter(s) IntraMuscular once      PHYSICAL EXAM:   Vital Signs Last 24 Hrs  T(C): 36.7 (03 Mar 2023 04:00), Max: 37.2 (02 Mar 2023 20:00)  T(F): 98 (03 Mar 2023 04:00), Max: 98.9 (02 Mar 2023 20:00)  HR: 62 (03 Mar 2023 07:44) (62 - 99)  BP: 102/67 (03 Mar 2023 07:44) (96/67 - 145/81)  BP(mean): 76 (03 Mar 2023 06:00) (76 - 101)  RR: 13 (03 Mar 2023 07:44) (13 - 20)  SpO2: 97% (03 Mar 2023 07:44) (94% - 100%)    Parameters below as of 03 Mar 2023 07:44  Patient On (Oxygen Delivery Method): room air        General: No acute distress  HEENT: EOM intact, visual fields full  Abdomen: Soft, nontender, nondistended   Extremities: No edema    NEUROLOGICAL EXAM:  Mental status: Awake, alert, oriented x3, no aphasia, no neglect, normal memory   Cranial Nerves: No facial asymmetry, no nystagmus, no dysarthria,  tongue midline  Motor exam: Normal tone, no drift, 5/5 RUE, 5/5 RLE, 5/5 LUE, 5/5 LLE, normal fine finger movements.  Sensation: Intact to light touch   Coordination/ Gait: No dysmetria, KAI intact and symmetric bilaterally    LABS:                        12.8   4.82  )-----------( 167      ( 03 Mar 2023 06:48 )             39.7    03-03    141  |  109<H>  |  27<H>  ----------------------------<  89  3.9   |  22  |  1.77<H>    Ca    9.2      03 Mar 2023 06:47    TPro  7.5  /  Alb  4.7  /  TBili  0.6  /  DBili  x   /  AST  21  /  ALT  19  /  AlkPhos  65  03-02  PT/INR - ( 03 Mar 2023 06:48 )   PT: 12.2 sec;   INR: 1.06 ratio         PTT - ( 03 Mar 2023 06:48 )  PTT:32.5 sec      IMAGING: Reviewed by me.     03/02/23:    MRI HEAD W/ contrast: Similar-appearing 5 to 6 mm acute right thalamocapsular parenchymal   hemorrhage and small surrounding edema, given differences in modality.    No underlying enhancement. No abnormal parenchymal or leptomeningeal   enhancement elsewhere in the brain.    The presence of hemorrhage limits evaluation for underlying restricted   diffusion. No abnormal restricted diffusion elsewhere in the brain.    Small foci of susceptibility artifact in the right thalamus (adjacent to   the acute hemorrhage) and left posterior frontal opercular region. These   may represent chronic microhemorrhages and/or tiny cavernomas.    CT HEAD: Hyperdensity within the right posterior limb of the internal capsule   suggestive of acute intraparenchymal hemorrhage. No significant mass   effect or midline shift.    Punctate faint hyperattenuation in the anterior frontal region may   reflect tiny mineralization, minimal hemorrhage, or underlying lesion   such as cavernous malformation.    CT angiography neck: No hemodynamically significant stenosis by NASCET   criteria. No vascular dissection.    CT angiography brain: Right posterior cerebral artery markedly diminutive   and irregular possibly reflecting vasospasm, vasculitis, or possible   stenosis. Mild luminal irregularity of the left proximal posterior   cerebral artery. Otherwise no major vessel occlusion or proximal   stenosis. No evidence of aneurysm or other vascular malformation.    CT perfusion: No core infarct or at risk territory.     THE PATIENT WAS SEEN AND EXAMINED BY ME WITH THE HOUSESTAFF AND STROKE TEAM DURING MORNING ROUNDS.   HPI:  63 y/o RH male PMHx kidney cancer (now has one kidney), prior stroke (unknown residual deficits), HTN presenting with left temporal, LUE, LLE numbness and heaviness, LKW 3/2/23 00;00. Sx started abruptly. Patient denies HA, vision changes, dizziness. Patient visiting from [place]. Takes ASA 81 mg daily and Lipitor 10 mg daily.     NIHSS:3  premrs;0  ICH:0  not a tenecteplase candidate given hemorrhage on CTH.  not a thrombectomy given no LVO.  (02 Mar 2023 02:29)      SUBJECTIVE: No events overnight.  No new neurologic complaints.  S/P cerebral angiogram    amLODIPine   Tablet 2.5 milliGRAM(s) Oral daily  atorvastatin 10 milliGRAM(s) Oral at bedtime  influenza   Vaccine 0.5 milliLiter(s) IntraMuscular once      PHYSICAL EXAM:   Vital Signs Last 24 Hrs  T(C): 36.7 (03 Mar 2023 04:00), Max: 37.2 (02 Mar 2023 20:00)  T(F): 98 (03 Mar 2023 04:00), Max: 98.9 (02 Mar 2023 20:00)  HR: 62 (03 Mar 2023 07:44) (62 - 99)  BP: 102/67 (03 Mar 2023 07:44) (96/67 - 145/81)  BP(mean): 76 (03 Mar 2023 06:00) (76 - 101)  RR: 13 (03 Mar 2023 07:44) (13 - 20)  SpO2: 97% (03 Mar 2023 07:44) (94% - 100%)    Parameters below as of 03 Mar 2023 07:44  Patient On (Oxygen Delivery Method): room air        General: No acute distress  HEENT: EOM intact, visual fields full  Abdomen: Soft, nontender, nondistended   Extremities: No edema    NEUROLOGICAL EXAM:  Mental status: Awake, alert, oriented x3, no aphasia, no neglect, normal memory   Cranial Nerves: No facial asymmetry, no nystagmus, no dysarthria,  tongue midline  Motor exam: Normal tone, no drift, 5/5 RUE, 5/5 RLE, 5/5 LUE, 5/5 LLE, normal fine finger movements.  Sensation: Intact to light touch   Coordination/ Gait: No dysmetria, KAI intact and symmetric bilaterally    LABS:                        12.8   4.82  )-----------( 167      ( 03 Mar 2023 06:48 )             39.7    03-03    141  |  109<H>  |  27<H>  ----------------------------<  89  3.9   |  22  |  1.77<H>    Ca    9.2      03 Mar 2023 06:47    TPro  7.5  /  Alb  4.7  /  TBili  0.6  /  DBili  x   /  AST  21  /  ALT  19  /  AlkPhos  65  03-02  PT/INR - ( 03 Mar 2023 06:48 )   PT: 12.2 sec;   INR: 1.06 ratio         PTT - ( 03 Mar 2023 06:48 )  PTT:32.5 sec      IMAGING: Reviewed by me.     03/02/23:    MRI HEAD W/ contrast: Similar-appearing 5 to 6 mm acute right thalamocapsular parenchymal   hemorrhage and small surrounding edema, given differences in modality.    No underlying enhancement. No abnormal parenchymal or leptomeningeal   enhancement elsewhere in the brain.    The presence of hemorrhage limits evaluation for underlying restricted   diffusion. No abnormal restricted diffusion elsewhere in the brain.    Small foci of susceptibility artifact in the right thalamus (adjacent to   the acute hemorrhage) and left posterior frontal opercular region. These   may represent chronic microhemorrhages and/or tiny cavernomas.    CT HEAD: Hyperdensity within the right posterior limb of the internal capsule   suggestive of acute intraparenchymal hemorrhage. No significant mass   effect or midline shift.    Punctate faint hyperattenuation in the anterior frontal region may   reflect tiny mineralization, minimal hemorrhage, or underlying lesion   such as cavernous malformation.    CT angiography neck: No hemodynamically significant stenosis by NASCET   criteria. No vascular dissection.    CT angiography brain: Right posterior cerebral artery markedly diminutive   and irregular possibly reflecting vasospasm, vasculitis, or possible   stenosis. Mild luminal irregularity of the left proximal posterior   cerebral artery. Otherwise no major vessel occlusion or proximal   stenosis. No evidence of aneurysm or other vascular malformation.    CT perfusion: No core infarct or at risk territory.

## 2023-03-03 NOTE — PROGRESS NOTE ADULT - ASSESSMENT
61 y/o RH male PMHx kidney cancer (now has one kidney), prior stroke (unknown residual deficits), HTN presenting with left temporal, LUE, LLE numbness and heaviness, LKW 3/2/23 00;00. Sx started abruptly. Patient denies HA, vision changes, dizziness. Patient visiting from [place]. Takes ASA 81 mg daily and Lipitor 10 mg daily.     Impression: right thalamocapsular parenchymal hemorrhage etiology chronic HTN vs underlying malformation    NEURO: Continue close monitoring for neurologic deterioration, goal SBP < 140, plan for cerebral angiogram today to rule out any underlying malformation, titrate statin to LDL goal less than 70, MRI Brain w/o, MRA Head w/o and Neck w/contrast. Physical therapy/OT/Speech eval/treatment.     ANTITHROMBOTIC THERAPY: none due to ICH    PULMONARY: CXR clear, protecting airway, saturating well     CARDIOVASCULAR: check TTE, cardiac monitoring                              SBP goal: < 140    GASTROINTESTINAL:  dysphagia screen-passed, tolerating diet, NPO for angiogram        Diet:  Regular    RENAL: BUN/Cr stable, good urine output      Na Goal: Greater than 135     Nichole: No    HEMATOLOGY: H/H stable, Platelets normal      DVT ppx: venodynes, plan to start chemical DVT PPX on 03/04 if ct stable    ID: afebrile, no leukocytosis     OTHER: Plan of care discussed with patient and son at bedside.     DISPOSITION: Rehab or home depending on PT eval once stable and workup is complete      CORE MEASURES:        Admission NIHSS:      TPA: [] YES [] NO      LDL/HDL:     Depression Screen:      Statin Therapy:     Dysphagia Screen: [] PASS [] FAIL     Smoking [] YES [] NO      Afib [] YES [] NO     Stroke Education [] YES [] NO    Obtain screening lower extremity venous ultrasound in patients who meet 1 or more of the following criteria as patient is high risk for DVT/PE on admission:   [] History of DVT/PE  []Hypercoagulable states (Factor V Leiden, Cancer, OCP, etc. )  []Prolonged immobility (hemiplegia/hemiparesis/post operative or any other extended immobilization)  [] Transferred from outside facility (Rehab or Long term care)  [] Age </= to 50. 61 y/o RH male PMHx kidney cancer (now has one kidney), prior stroke (unknown residual deficits), HTN presenting with left temporal, LUE, LLE numbness and heaviness, LKW 3/2/23 00;00. Sx started abruptly. Patient denies HA, vision changes, dizziness. Patient visiting from [place]. Takes ASA 81 mg daily and Lipitor 10 mg daily.     Impression: right thalamocapsular parenchymal hemorrhage etiology chronic HTN vs underlying malformation    NEURO: Continue close monitoring for neurologic deterioration, goal SBP < 140, s/p cerebral angiogram: negative for source of hemorrhage, titrate statin to LDL goal less than 70, MRI Brain w/ noted above.  Physical therapy/OT: home    ANTITHROMBOTIC THERAPY: none due to ICH, plan to start ASA in 1 week after stable CT HEAD    PULMONARY: CXR clear, protecting airway, saturating well     CARDIOVASCULAR: check TTE, cardiac monitoring                              SBP goal: < 140    GASTROINTESTINAL:  dysphagia screen-passed, tolerating diet, NPO for angiogram        Diet:  Regular    RENAL: BUN/Cr 27/1.77, will encourage hydration, started on fluids good urine output      Na Goal: Greater than 135     Nichole: No    HEMATOLOGY: H/H stable, Platelets 167, Hematology consulted for cancer history and will follow as outpatient for work up.     DVT ppx: venodynes, plan to start chemical DVT PPX on 03/04 if ct stable    ID: afebrile, no leukocytosis     OTHER: Plan of care discussed with patient and son at bedside.     DISPOSITION: Home once stable and workup is complete      CORE MEASURES:        Admission NIHSS:      TPA: [] YES [] NO      LDL/HDL:     Depression Screen:      Statin Therapy:     Dysphagia Screen: [] PASS [] FAIL     Smoking [] YES [] NO      Afib [] YES [] NO     Stroke Education [] YES [] NO    Obtain screening lower extremity venous ultrasound in patients who meet 1 or more of the following criteria as patient is high risk for DVT/PE on admission:   [] History of DVT/PE  []Hypercoagulable states (Factor V Leiden, Cancer, OCP, etc. )  []Prolonged immobility (hemiplegia/hemiparesis/post operative or any other extended immobilization)  [] Transferred from outside facility (Rehab or Long term care)  [] Age </= to 50. 63 y/o RH male PMHx kidney cancer (now has one kidney), prior stroke (unknown residual deficits), HTN presenting with left temporal, LUE, LLE numbness and heaviness, LKW 3/2/23 00;00. Sx started abruptly. Patient denies HA, vision changes, dizziness. Patient visiting from [place]. Takes ASA 81 mg daily and Lipitor 10 mg daily.     Impression: right thalamocapsular parenchymal hemorrhage etiology chronic HTN vs underlying malformation    NEURO: Continue close monitoring for neurologic deterioration, goal SBP < 140, s/p cerebral angiogram: negative for source of hemorrhage, LDL 66: continue home dose statin, MRI Brain w/ noted above.  Physical therapy/OT: home    ANTITHROMBOTIC THERAPY: none due to ICH, plan to start ASA in 1 week after stable CT HEAD    PULMONARY: CXR clear, protecting airway, saturating well     CARDIOVASCULAR: check TTE, cardiac monitoring                              SBP goal: < 140    GASTROINTESTINAL:  dysphagia screen-passed, tolerating diet,     Diet:  Regular    RENAL: BUN/Cr 27/1.77, will encourage hydration, started on fluids good urine output      Na Goal: Greater than 135     Nichole: No    HEMATOLOGY: H/H stable, Platelets 167, Hematology consulted for cancer history and will follow as outpatient for work up.     DVT ppx: venodynes, plan to start chemical DVT PPX on 03/04 if ct stable    ID: afebrile, no leukocytosis     OTHER: Plan of care discussed with patient and son at bedside.     DISPOSITION: Home once stable and workup is complete      CORE MEASURES:        Admission NIHSS:      TPA: [] YES [] NO      LDL/HDL:     Depression Screen:      Statin Therapy:     Dysphagia Screen: [] PASS [] FAIL     Smoking [] YES [] NO      Afib [] YES [] NO     Stroke Education [] YES [] NO    Obtain screening lower extremity venous ultrasound in patients who meet 1 or more of the following criteria as patient is high risk for DVT/PE on admission:   [] History of DVT/PE  []Hypercoagulable states (Factor V Leiden, Cancer, OCP, etc. )  []Prolonged immobility (hemiplegia/hemiparesis/post operative or any other extended immobilization)  [] Transferred from outside facility (Rehab or Long term care)  [] Age </= to 50.

## 2023-03-03 NOTE — CHART NOTE - NSCHARTNOTEFT_GEN_A_CORE
Safeguard removed per IR insturctions at 12:00. Patient tolerated procedure well. Manual compression held to hemostasis, no active bleeding, no hematoma, or pain. Dressing placed. Pulses palpable. Continue pulse checks per order. Discussed with RN. Safeguard removed per IR instructions at 12:00. Patient tolerated procedure well. Manual compression held to hemostasis, no active bleeding, no hematoma, or pain. Dressing placed. Pulses palpable. Continue pulse checks per order. Discussed with RN.

## 2023-03-03 NOTE — PROGRESS NOTE ADULT - ASSESSMENT
61 y/o RH male PMHx kidney cancer (now has one kidney), prior stroke (unknown residual deficits), HTN presenting with left temporal, LUE, LLE numbness and heaviness, LKW 3/2/23 00;00. Sx started abruptly. Patient denies HA, vision changes, dizziness. Patient visiting from [place]. Takes ASA 81 mg daily and Lipitor 10 mg daily.

## 2023-03-03 NOTE — PROGRESS NOTE ADULT - PROBLEM SELECTOR PLAN 2
BP goal <140/90  s/p eleniene gtt - d/c'd this am   start amlodipine 5mg PO daily when needed  continue to monitor.

## 2023-03-03 NOTE — PROGRESS NOTE ADULT - SUBJECTIVE AND OBJECTIVE BOX
Subjective: Patient seen and examined. No new events except as noted.   In stroke unit   s/p selective cerebral angiography:  no source for hemorrhage       REVIEW OF SYSTEMS:    CONSTITUTIONAL:+ weakness, fevers or chills  EYES/ENT: No visual changes;  No vertigo or throat pain   NECK: No pain or stiffness  RESPIRATORY: No cough, wheezing, hemoptysis; No shortness of breath  CARDIOVASCULAR: No chest pain or palpitations  GASTROINTESTINAL: No abdominal or epigastric pain. No nausea, vomiting, or hematemesis; No diarrhea or constipation. No melena or hematochezia.  GENITOURINARY: No dysuria, frequency or hematuria  NEUROLOGICAL: No numbness or weakness  SKIN: No itching, burning, rashes, or lesions   All other review of systems is negative unless indicated above.    MEDICATIONS:  MEDICATIONS  (STANDING):  amLODIPine   Tablet 2.5 milliGRAM(s) Oral daily  atorvastatin 10 milliGRAM(s) Oral at bedtime  influenza   Vaccine 0.5 milliLiter(s) IntraMuscular once      PHYSICAL EXAM:  T(C): 36.6 (03-03-23 @ 09:50), Max: 37.2 (03-02-23 @ 20:00)  HR: 55 (03-03-23 @ 10:50) (55 - 99)  BP: 109/67 (03-03-23 @ 10:50) (96/67 - 140/82)  RR: 18 (03-03-23 @ 10:50) (13 - 18)  SpO2: 100% (03-03-23 @ 10:50) (95% - 100%)  Wt(kg): --  I&O's Summary    02 Mar 2023 07:01  -  03 Mar 2023 07:00  --------------------------------------------------------  IN: 210 mL / OUT: 750 mL / NET: -540 mL        Weight (kg): 59.4 (03-03 @ 08:21)      Appearance: Normal	  HEENT: Normal oral mucosa, PERRL, EOMI	  Lymphatic: No lymphadenopathy  Cardiovascular: Normal S1 S2, No JVD, No murmurs, No edema  Respiratory: Lungs clear to auscultation	  Gastrointestinal:  Soft, Non-tender, + BS	  Skin: No rashes, No ecchymoses, No cyanosis	  Mental Status: Orientated to self, date and place.  Attention intact.  No dysarthria. Speech fluent.  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    CN V1-3 intact to light touch .  No facial asymmetry.  Hearing intact to finger rub bilaterally.  Tongue, uvula and palate midline.  Sternocleidomastoid and Trapezius intact bilaterally.  Motor:   Tone: normal.                  Strength:     [] Upper extremity                      Delt       Bicep    Tricep                                                  R         5/5        5/5        5/5       5/5                                               L          5/5        5/5        5/5       5/5  [] Lower extremity                       HF          KE          KF        DF         PF                                               R        5/5 5/5 5/5       5/5       5/5                                               L         4/5 4/5 4/5       5/5        5/5  Pronator drift: none                 Dysmetria: None to finger-nose-finger   No truncal ataxia.    Tremor: No resting, postural or action tremor.  No myoclonus.  Sensation: decreased to light touch and pinprick in LUE and LLE.   Gait: Drags left foot  Vascular: Peripheral pulses palpable 2+ bilaterally        LABS:    CARDIAC MARKERS:                                12.8   4.82  )-----------( 167      ( 03 Mar 2023 06:48 )             39.7     03-03    141  |  109<H>  |  27<H>  ----------------------------<  89  3.9   |  22  |  1.77<H>    Ca    9.2      03 Mar 2023 06:47    TPro  7.5  /  Alb  4.7  /  TBili  0.6  /  DBili  x   /  AST  21  /  ALT  19  /  AlkPhos  65  03-02    proBNP:   Lipid Profile:   HgA1c:   TSH:             TELEMETRY: 	SR     ECG:  	  RADIOLOGY:  < from: CT Head No Cont (03.02.23 @ 06:40) >    ACC: [id number] EXAM:  CT BRAIN   ORDERED BY:  JACQUE MCCAIN     PROCEDURE DATE:  03/02/2023          INTERPRETATION:  INDICATIONS:  IPH    TECHNIQUE:  Serial axial images were obtained from the skull base to the   vertex without intravenous contrast. Sagittal and Coronal reformats were   performed    COMPARISON EXAMINATION: 3/2/2023    FINDINGS:  VENTRICLES AND SULCI:  Normal.  INTRA-AXIAL: Again appreciated is the focus of high attenuation in the   distal limb of the right internal capsule measuring 8 x 9 x 10 mm (   [id number]) which is measuring slightly larger when compared with the   prior. This may represent a focus of acute hemorrhage. Alternatively   possibility of cavernous malformation is a consideration as well.   Correlation withMR recommended.  EXTRA-AXIAL: No mass or collection is seen. Residual contrast from prior   CTA is seen.  VISUALIZED SINUSES:  Clear.  VISUALIZED MASTOIDS:  Clear.  CALVARIUM:  Normal.  MISCELLANEOUS:  None.    IMPRESSION:  High attenuation focus in the distal limb of the right   internal capsule measuring slightly larger compared with earlier the same   day. Recommend MR for more complete evaluation    --- End of Report ---            TE SUH MD; Attending Radiologist  This document has been electronically signed. Mar  2 2023 10:00AM    < end of copied text >    DIAGNOSTIC TESTING:  [ ] Echocardiogram:  [ ]  Catheterization:  [ ] Stress Test:    OTHER:

## 2023-03-03 NOTE — SPEECH LANGUAGE PATHOLOGY EVALUATION - H & P REVIEW
61 y/o RH male PMHx kidney cancer (in remission, had surgery ~5 years ago, now has one kidney), prior stroke (unknown residual deficits), HTN presenting with left temporal, LUE, LLE numbness and heaviness, LKW 3/2/23 00;00. Sx started abruptly. Patient denies HA, vision changes, dizziness. Patient visiting from [place]. Takes ASA 81 mg daily and Lipitor 10 mg daily. Per H&P, "He (pt) was told that there are some nodules that are inflammatory but they have not changed and the cancer hasn't spread. He has history of 2 prior ischemic strokes one what seems retinal artery occlusion that left him blind in the right eye and another one that he can not recall the symptoms but his doctor in [place] had explained to them that after the surgery he was placed on a medication for his cancer and that the medication might contribute to a hypercoagulable state which was deemed etiology of the stroke." CT of the brain reveals a small thalamocapsular hemorrrhage. Underlying brain lesion as cavernoma or  mass-will evaluate with MR of the brain. Vascular arteriovenous shunting unlikely and not evidence in CT angiogram for which will consider cerebral angiography. NIHSS:2. premrs;0. ICH:0. not a tenecteplase candidate given hemorrhage on CTH. not a thrombectomy given no LVO. Impression: right thalamocapsular parenchymal hemorrhage etiology chronic HTN vs underlying malformation. Cardiology following for cardiac mgmt./yes

## 2023-03-03 NOTE — CHART NOTE - NSCHARTNOTEFT_GEN_A_CORE
Interventional Neuro- Radiology   Procedure Note      Procedure: Selective Cerebral Angiography   Pre- Procedure Diagnosis:  right thalamocapsular parenchymal hemorrhage   Post- Procedure Diagnosis: no source for hemorrhage     : Dr. Kade MD  Fellow: Dr. Loomis   Physician Assistant: Skye Orr PA-C    RN: Miguelangel Moore: Frederick/ Macie     Anesthesia: Dr. Cisneros  (MAC)       I/Os:  Fluids: 400cc   Nichole: DTV   Contrast: 48cc of visi 270   Estimated Blood Loss: <10cc    Preliminary Report:  Under MAC, using a 5Fr arrow sheath to the right groin examination of left vertebral artery/ left internal carotid artery/ left external carotid artery/ right vertebral artery/ right internal carotid artery/ right external carotid artery via selective cerebral angiography demonstrates no source for hemorrhage. ( Official note to follow).    Patient tolerated procedure well, vital signs stable, hemodynamically stable, no change in neurological status compared to baseline. Results discussed with neurosurgery/ patient and their family. Groin sheath d/c'ed, manual compression held to hemostasis, no active bleeding, no hematoma, Vascade applied, quick clot and safeguard balloon dressing applied at 945h. Patient transferred to  IR recovery for further care/ monitoring.     Skye Orr PA-C  [phone]

## 2023-03-03 NOTE — SPEECH LANGUAGE PATHOLOGY EVALUATION - SPECIFY REASON(S)
to assess speech production, expressive and receptive language skills, and cognitive-communication skills

## 2023-03-03 NOTE — CHART NOTE - NSCHARTNOTEFT_GEN_A_CORE
Interventional Neuro Radiology  Pre-Procedure Note PA-C    This is a 62y ____ hand dominant Male      HPI:  61 y/o RH male PMHx kidney cancer (now has one kidney), prior stroke (unknown residual deficits), HTN presenting with left temporal, LUE, LLE numbness and heaviness, LKW 3/2/23 00;00. Sx started abruptly. Patient denies HA, vision changes, dizziness. Patient visiting from [place]. Takes ASA 81 mg daily and Lipitor 10 mg daily.     NIHSS:3  premrs;0  ICH:0  not a tenecteplase candidate given hemorrhage on CTH.  not a thrombectomy given no LVO.  (02 Mar 2023 02:29)      Allergies: No Known Allergies      PAST MEDICAL & SURGICAL HISTORY:  CVA (cerebral vascular accident)      Cancer of kidney      Hypertension      Profound vision loss of one eye  right      Kidney stone      Gall stones      Hard of hearing  LEFT      H/O kidney removal          Social History:     FAMILY HISTORY:      Current Medications: amLODIPine   Tablet 2.5 milliGRAM(s) Oral daily  atorvastatin 10 milliGRAM(s) Oral at bedtime  influenza   Vaccine 0.5 milliLiter(s) IntraMuscular once      Labs:                         12.8   4.82  )-----------( 167      ( 03 Mar 2023 06:48 )             39.7       03-03    141  |  109<H>  |  27<H>  ----------------------------<  89  3.9   |  22  |  1.77<H>    Ca    9.2      03 Mar 2023 06:47    TPro  7.5  /  Alb  4.7  /  TBili  0.6  /  DBili  x   /  AST  21  /  ALT  19  /  AlkPhos  65  03-02          Blood Bank: 03-03-23  O  --  Positive        Assessment/Plan:     This is a 62y  year old right  hand dominant Male  presents with   Patient presents to neuro-IR for selective cerebral angiography.   Procedure, goals, risks, benefits and alternatives  were discussed with patient and patient's family.  All questions were answered.  Risks include but are not limited to stroke, vessel injury, hemorrhage, and or right  groin hematoma.  Patient demonstrates understanding  of all risks involved with this procedure and wishes to continue.   Appropriate  content was obtained from patient and consent is in the patient's chart.

## 2023-03-03 NOTE — CHART NOTE - NSCHARTNOTEFT_GEN_A_CORE
Interventional Neuro Radiology  Pre-Procedure Note    This is a62 y/o RH male PMHx kidney cancer (now has one kidney), prior stroke (unknown residual deficits), HTN presenting with left temporal, LUE, LLE numbness and heaviness, LKW 3/2/23 00;00. Sx started abruptly. Patient denies HA, vision changes, dizziness. Patient visiting from [place]. Takes ASA 81 mg daily and Lipitor 10 mg daily.         Neuro Exam:   Mental status: Awake, alert, oriented x3, no aphasia, no neglect, normal memory   Cranial Nerves: No facial asymmetry, no nystagmus, no dysarthria,  tongue midline  Motor exam: Normal tone, no drift, 5/5 RUE, 5/5 RLE, 5/5 LUE, 5/5 LLE, normal fine finger movements.  Sensation: Intact to light touch   Coordination/ Gait: No dysmetria, KAI intact and symmetric bilaterally    PAST MEDICAL & SURGICAL HISTORY:  CVA (cerebral vascular accident)  Cancer of kidney  Hypertension  Profound vision loss of one eye right  Kidney stone  Gall stones  Hard of hearing LEFT  H/O kidney removal    Social History:   Denies tobacco use    FAMILY HISTORY:  No pertinent family history    Allergies:   No Known Allergies    Current Medications:   amLODIPine   Tablet 2.5 milliGRAM(s) Oral daily  atorvastatin 10 milliGRAM(s) Oral at bedtime  influenza   Vaccine 0.5 milliLiter(s) IntraMuscular once      Labs:                         12.8   4.82  )-----------( 167      ( 03 Mar 2023 06:48 )             39.7       03-03    141  |  109<H>  |  27<H>  ----------------------------<  89  3.9   |  22  |  1.77<H>    Ca    9.2      03 Mar 2023 06:47    TPro  7.5  /  Alb  4.7  /  TBili  0.6  /  DBili  x   /  AST  21  /  ALT  19  /  AlkPhos  65  03-02        Blood Bank: 03-03-23  O  --  Positive      Assessment/Plan:   This is a 63yo male  presents with right thalamocapsular parenchymal hemorrhage etiology chronic HTN vs underlying malformation. Patient presents to neuro-IR for selective cerebral angiography. Procedure/ risks/ benefits/ goals/ alternatives were explained. Risks include but are not limited to stroke/ vessel injury/ hemorrhage/ groin hematoma. All questions answered. Informed content obtained from patient with Mandarin translation. Consent placed in chart.     Skye Orr PA-C  [phone]

## 2023-03-03 NOTE — PRE-ANESTHESIA EVALUATION ADULT - NSANTHOSAYNRD_GEN_A_CORE
No. ALVIN screening performed.  STOP BANG Legend: 0-2 = LOW Risk; 3-4 = INTERMEDIATE Risk; 5-8 = HIGH Risk

## 2023-03-03 NOTE — SPEECH LANGUAGE PATHOLOGY EVALUATION - COMMENTS
hx con't  3/3/23 s/p cerebral angiography which demonstrates no source for hemorrhage.    MR Head 3/2/23 IMPRESSION: Similar-appearing 5 to 6 mm acute right thalamocapsular parenchymal hemorrhage and small surrounding edema, given differences in modality. No underlying enhancement. No abnormal parenchymal or leptomeningeal enhancement elsewhere in the brain. The presence of hemorrhage limits evaluation for underlying restricted diffusion. No abnormal restricted diffusion elsewhere in the brain. Small foci of susceptibility artifact in the right thalamus (adjacent to the acute hemorrhage) and left posterior frontal opercular region. These may represent chronic microhemorrhages and/or tiny cavernomas.    Dysphagia screen passed 3/2/23 and pt now on regular texture diet.    No prior SLP exams in [facility] or [facility] in PACS hx con't  3/3/23 s/p cerebral angiography     MR Head 3/2/23 IMPRESSION: Similar-appearing 5 to 6 mm acute right thalamocapsular parenchymal hemorrhage and small surrounding edema, given differences in modality. No underlying enhancement. No abnormal parenchymal or leptomeningeal enhancement elsewhere in the brain. The presence of hemorrhage limits evaluation for underlying restricted diffusion. No abnormal restricted diffusion elsewhere in the brain. Small foci of susceptibility artifact in the right thalamus (adjacent to the acute hemorrhage) and left posterior frontal opercular region. These may represent chronic microhemorrhages and/or tiny cavernomas.    Dysphagia screen passed 3/2/23 and pt now on regular texture diet.    No prior SLP exams in [facility] or [facility] in PACS [facility]FL

## 2023-03-03 NOTE — CONSULT NOTE ADULT - SUBJECTIVE AND OBJECTIVE BOX
CHIEF COMPLAINT:  Stroke     HISTORY OF PRESENT ILLNESS:  61 y/o RH male PMHx kidney cancer (now has one kidney), prior stroke (unknown residual deficits), HTN presenting with left temporal, LUE, LLE numbness and heaviness, LKW 3/2/23 00;00. Sx started abruptly. Patient denies HA, vision changes, dizziness. Patient visiting from [place]. Takes ASA 81 mg daily and Lipitor 10 mg daily.    Son at bedside to help with translation.  Cardiology consulted for cardiac management.  Pt denies cardiac history.  Denies chest pain, SOB, palpitations.     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    Kidney stone    Gall stones    Hard of hearing  LEFT    H/O kidney removal    MEDICATIONS:  niCARdipine Infusion 5 mG/Hr IV Continuous <Continuous>    atorvastatin 10 milliGRAM(s) Oral at bedtime    influenza   Vaccine 0.5 milliLiter(s) IntraMuscular once    FAMILY HISTORY:    SOCIAL HISTORY:    [ ] Non-smoker  [ ] Smoker  [ ] Alcohol    Allergies    No Known Allergies    Intolerances    REVIEW OF SYSTEMS:  CONSTITUTIONAL: No fever, weight loss, + fatigue  EYES: No eye pain, visual disturbances, or discharge  ENMT:  No difficulty hearing, tinnitus, vertigo; No sinus or throat pain  NECK: No pain or stiffness  RESPIRATORY: No cough, wheezing, chills or hemoptysis; No Shortness of Breath  CARDIOVASCULAR: No chest pain, palpitations, passing out, dizziness, or leg swelling  GASTROINTESTINAL: No abdominal or epigastric pain. No nausea, vomiting, or hematemesis; No diarrhea or constipation. No melena or hematochezia.  GENITOURINARY: No dysuria, frequency, hematuria, or incontinence  NEUROLOGICAL: No headaches, memory loss, loss of strength, numbness, or tremors  SKIN: No itching, burning, rashes, or lesions   LYMPH Nodes: No enlarged glands  ENDOCRINE: No heat or cold intolerance; No hair loss  MUSCULOSKELETAL: No joint pain or swelling; No muscle, back, or extremity pain  PSYCHIATRIC: No depression, anxiety, mood swings, or difficulty sleeping  HEME/LYMPH: No easy bruising, or bleeding gums  ALLERY AND IMMUNOLOGIC: No hives or eczema	    [ ] All others negative	  [ ] Unable to obtain    PHYSICAL EXAM:  T(C): 37.1 (03-02-23 @ 08:00), Max: 37.1 (03-02-23 @ 01:39)  HR: 85 (03-02-23 @ 08:00) (64 - 97)  BP: 118/70 (03-02-23 @ 08:00) (106/69 - 181/101)  RR: 20 (03-02-23 @ 08:00) (11 - 20)  SpO2: 100% (03-02-23 @ 08:00) (97% - 100%)  Wt(kg): --  I&O's Summary    01 Mar 2023 07:01  -  02 Mar 2023 07:00  --------------------------------------------------------  IN: 1140.6 mL / OUT: 750 mL / NET: 390.6 mL    Appearance: Normal	  HEENT: Normal oral mucosa, PERRL, EOMI	  Lymphatic: No lymphadenopathy  Cardiovascular: Normal S1 S2, No JVD, No murmurs, No edema  Respiratory: Lungs clear to auscultation	  Gastrointestinal:  Soft, Non-tender, + BS	  Skin: No rashes, No ecchymoses, No cyanosis	  Mental Status: Orientated to self, date and place.  Attention intact.  No dysarthria. Speech fluent.  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    CN V1-3 intact to light touch .  No facial asymmetry.  Hearing intact to finger rub bilaterally.  Tongue, uvula and palate midline.  Sternocleidomastoid and Trapezius intact bilaterally.  Motor:   Tone: normal.                  Strength:     [] Upper extremity                      Delt       Bicep    Tricep                                                  R         5/5        5/5        5/5       5/5                                               L          5/5        5/5        5/5       5/5  [] Lower extremity                       HF          KE          KF        DF         PF                                               R        5/5        5/5        5/5       5/5       5/5                                               L         4/5        4/5       4/5       5/5        5/5  Pronator drift: none                 Dysmetria: None to finger-nose-finger   No truncal ataxia.    Tremor: No resting, postural or action tremor.  No myoclonus.  Sensation: decreased to light touch and pinprick in LUE and LLE.   Gait: Drags left foot  Vascular: Peripheral pulses palpable 2+ bilaterally    TELEMETRY: 	    ECG:  	  RADIOLOGY: < from: CT Angio Brain Stroke Protocol  w/ IV Cont (03.02.23 @ 02:00) >  ACC: [id number] EXAM:  CT ANGIO NECK STROKE PROTCL IC   ORDERED BY:   JOE PRASAD     ACC: [id number] EXAM:  CT BRAIN PERFUSION MAPS STROKE   ORDERED BY:   JOE PRASAD     ACC: [id number] EXAM:  CT ANGIO BRAIN STROKE PROTC IC   ORDERED BY:   JOE PRASAD     PROCEDURE DATE:  03/02/2023          INTERPRETATION:  CLINICAL INFORMATION: Stroke code. Left-sided weakness   with right facial asymmetry.    TECHNIQUE:  Contrast enhanced CT angiography of the neck and head was performed.  MIP   reformats were generated in the coronal, sagittal and axial planes.  CT perfusion was also performed.    Intravenous contrast: 120 cc Omnipaque 300 were administered    COMPARISON: Same day noncontrast CT head.    FINDINGS:    CT ANGIOGRAPHY NECK:    Three-vessel aortic arch. Minimal atherosclerotic calcifications without   stenosis. The origins of the great vessels are unremarkable. The common   carotid arteries are normal in caliber without significant stenosis.    The carotid bifurcations are unremarkable. The internal carotid arteries   are normal in caliber without significant stenosis.    Right vertebral artery mildly dominant. The vertebral arteries are normal   in caliber without significant stenosis.    The visualized neck and upper chest are unremarkable. Visualized osseous   structures are unremarkable.      CT ANGIOGRAPHY BRAIN:    Anterior circulation: Minimal calcification of the carotid siphons   bilaterally. Intracranial internal carotid arteries remain patent.The   bilateral anterior cerebral and middle cerebral arteries are patent   without evidence of significant stenosis, major vessel occlusion, or   aneurysm.    Posterior circulation: Vertebrobasilar system patent without evidence of   significant stenosis, major vessel occlusion, or aneurysm. Mild luminal   irregularity of the proximal left posterior cerebral artery P1 and P2   segments without stenosis or occlusion. The right posterior cerebral   artery is markedly diminutive and irregular beyond the proximal P1   segment, possibly reflecting vasospasm, vasculitis, possible stenosis.    No enlarged vascular lesions or clusters of abnormal vessels are noted to   suggest an arterial venous malformation.    Visualized portions of the superficial and deep venous systems are   unremarkable.    CT PERFUSION:    CBF <30%:?0 cc?  Tmax > 6s:? 0 cc  Mismatch volume: 0 cc  Mismatch ratio: None.    IMPRESSION:    CT angiography neck: No hemodynamically significant stenosis by NASCET   criteria. No vascular dissection.    CT angiography brain: Right posterior cerebral artery markedly diminutive   and irregular possibly reflecting vasospasm, vasculitis, or possible   stenosis. Mild luminal irregularity of the left proximal posterior   cerebral artery. Otherwise no major vessel occlusion or proximal   stenosis. No evidence of aneurysm or other vascular malformation.    CT perfusion: No core infarct or at risk territory.    Findings were discussed with Dr. Campos 3/2/2023 2:19 AM by Dr. Martinez with read back confirmation.    --- End of Report ---    < end of copied text >  < from: CT Brain Perfusion Maps Stroke (03.02.23 @ 01:58) >  ACC: [id number] EXAM:  CT ANGIO NECK STROKE PROTCL IC   ORDERED BY:   [employer]     ACC: [id number] EXAM:  CT BRAIN PERFUSION MAPS STROKE   ORDERED BY:   MailgunOM     ACC: [id number] EXAM:  CT ANGIO BRAIN STROKE PROTC IC   ORDERED BY:   [employer]     PROCEDURE DATE:  03/02/2023          INTERPRETATION:  CLINICAL INFORMATION: Stroke code. Left-sided weakness   with right facial asymmetry.    TECHNIQUE:  Contrast enhanced CT angiography of the neck and head was performed.  MIP   reformats were generated in the coronal, sagittal and axial planes.  CT perfusion was also performed.    Intravenous contrast: 120 cc Omnipaque 300 were administered    COMPARISON: Same day noncontrast CT head.    FINDINGS:    CT ANGIOGRAPHY NECK:    Three-vessel aortic arch. Minimal atherosclerotic calcifications without   stenosis. The origins of the great vessels are unremarkable. The common   carotid arteries are normal in caliber without significant stenosis.    The carotid bifurcations are unremarkable. The internal carotid arteries   are normal in caliber without significant stenosis.    Right vertebral artery mildly dominant. The vertebral arteries are normal   in caliber without significant stenosis.    The visualized neck and upper chest are unremarkable. Visualized osseous   structures are unremarkable.      CT ANGIOGRAPHY BRAIN:    Anterior circulation: Minimal calcification of the carotid siphons   bilaterally. Intracranial internal carotid arteries remain patent.The   bilateral anterior cerebral and middle cerebral arteries are patent   without evidence of significant stenosis, major vessel occlusion, or   aneurysm.    Posterior circulation: Vertebrobasilar system patent without evidence of   significant stenosis, major vessel occlusion, or aneurysm. Mild luminal   irregularity of the proximal left posterior cerebral artery P1 and P2   segments without stenosis or occlusion. The right posterior cerebral   artery is markedly diminutive and irregular beyond the proximal P1   segment, possibly reflecting vasospasm, vasculitis, possible stenosis.    No enlarged vascular lesions or clusters of abnormal vessels are noted to   suggest an arterial venous malformation.    Visualized portions of the superficial and deep venous systems are   unremarkable.    CT PERFUSION:    CBF <30%:?0 cc?  Tmax > 6s:? 0 cc  Mismatch volume: 0 cc  Mismatch ratio: None.    IMPRESSION:    CT angiography neck: No hemodynamically significant stenosis by NASCET   criteria. No vascular dissection.    CT angiography brain: Right posterior cerebral artery markedly diminutive   and irregular possibly reflecting vasospasm, vasculitis, or possible   stenosis. Mild luminal irregularity of the left proximal posterior   cerebral artery. Otherwise no major vessel occlusion or proximal   stenosis. No evidence of aneurysm or other vascular malformation.    CT perfusion: No core infarct or at risk territory.    Findings were discussed with Dr. Campos 3/2/2023 2:19 AM by Dr. Martinez with read back confirmation.    --- End of Report ---    < end of copied text >    OTHER: 	  	  LABS:	 	    CARDIAC MARKERS: Troponin T, High Sensitivity Result: 23: Specimen not hemolyzed                         13.6   6.33  )-----------( 171      ( 02 Mar 2023 05:49 )             42.4     03-02    140  |  107  |  23  ----------------------------<  97  3.7   |  18<L>  |  1.30    Ca    9.2      02 Mar 2023 05:49    TPro  7.5  /  Alb  4.7  /  TBili  0.6  /  DBili  x   /  AST  21  /  ALT  19  /  AlkPhos  65  03-02    proBNP:   Lipid Profile:   HgA1c:   TSH:
Reason for consult:    HPI:  History taken with his son at his side, patient speaks Mandarin.   This is a 62 year old male with a history of renal cancer, diagnosed in 2015 s/p a right nephrectomy in the [place] in [place].  He was found to have lymph node involvement at that time, ?stage III disease for which he was given adjuvant Sorafenib for 5 years until 2020.  He had significant fatigue, depressed appetite, retinal thrombosis on the treatment.  Since he stopped the sofafenib his appetite and weight improved and overall has felt well.  He most recently had a CT of his chest/abdomen/pelvis in 11/2022 with no evidence of disease- unchanged 'shadow' by his kidney, unobstructing renal calculi, hepatic cyst, gallbladder stone.     He is currently visiting his son and is planning to return to [place] in June.     He is now presenting with left temporal, LUE, LLE numbness and heaviness, LKW 3/2/23 00;00. Sx started abruptly. Patient denies HA, vision changes, dizziness. Patient visiting from [place]. Takes ASA 81 mg daily and Lipitor 10 mg daily.     NIHSS:3  premrs;0  ICH:0  not a tenecteplase candidate given hemorrhage on CTH.  not a thrombectomy given no LVO.  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 1000 milliLiter(s) (75 mL/Hr) IV Continuous <Continuous>    MEDICATIONS  (PRN):      ROS  per HPI    T(C): 37.3 (03-03-23 @ 11:05), Max: 37.3 (03-03-23 @ 11:05)  HR: 76 (03-03-23 @ 18:00) (55 - 88)  BP: 126/89 (03-03-23 @ 18:00) (100/66 - 140/82)  RR: 17 (03-03-23 @ 18:00) (13 - 18)  SpO2: 99% (03-03-23 @ 18:00) (95% - 100%)  Wt(kg): --    PE  NAD  Awake, alert  Anicteric, MMM  RRR  CTAB  Abd soft, NT, ND, no masses  No edema  no rashes                          12.8   4.82  )-----------( 167      ( 03 Mar 2023 06:48 )             39.7       03-03    141  |  109<H>  |  27<H>  ----------------------------<  89  3.9   |  22  |  1.77<H>    Ca    9.2      03 Mar 2023 06:47    TPro  7.5  /  Alb  4.7  /  TBili  0.6  /  DBili  x   /  AST  21  /  ALT  19  /  AlkPhos  65  03-02

## 2023-03-04 ENCOUNTER — TRANSCRIPTION ENCOUNTER (OUTPATIENT)
Age: 63
End: 2023-03-04

## 2023-03-04 VITALS
SYSTOLIC BLOOD PRESSURE: 109 MMHG | HEART RATE: 81 BPM | DIASTOLIC BLOOD PRESSURE: 68 MMHG | RESPIRATION RATE: 19 BRPM | OXYGEN SATURATION: 99 %

## 2023-03-04 LAB
ANION GAP SERPL CALC-SCNC: 9 MMOL/L — SIGNIFICANT CHANGE UP (ref 5–17)
BUN SERPL-MCNC: 27 MG/DL — HIGH (ref 7–23)
CALCIUM SERPL-MCNC: 8.8 MG/DL — SIGNIFICANT CHANGE UP (ref 8.4–10.5)
CHLORIDE SERPL-SCNC: 111 MMOL/L — HIGH (ref 96–108)
CO2 SERPL-SCNC: 23 MMOL/L — SIGNIFICANT CHANGE UP (ref 22–31)
CREAT SERPL-MCNC: 1.66 MG/DL — HIGH (ref 0.5–1.3)
EGFR: 47 ML/MIN/1.73M2 — LOW
GLUCOSE SERPL-MCNC: 92 MG/DL — SIGNIFICANT CHANGE UP (ref 70–99)
HCT VFR BLD CALC: 38.9 % — LOW (ref 39–50)
HGB BLD-MCNC: 12.4 G/DL — LOW (ref 13–17)
MCHC RBC-ENTMCNC: 30.8 PG — SIGNIFICANT CHANGE UP (ref 27–34)
MCHC RBC-ENTMCNC: 31.9 GM/DL — LOW (ref 32–36)
MCV RBC AUTO: 96.5 FL — SIGNIFICANT CHANGE UP (ref 80–100)
NRBC # BLD: 0 /100 WBCS — SIGNIFICANT CHANGE UP (ref 0–0)
PLATELET # BLD AUTO: 160 K/UL — SIGNIFICANT CHANGE UP (ref 150–400)
POTASSIUM SERPL-MCNC: 4.5 MMOL/L — SIGNIFICANT CHANGE UP (ref 3.5–5.3)
POTASSIUM SERPL-SCNC: 4.5 MMOL/L — SIGNIFICANT CHANGE UP (ref 3.5–5.3)
RBC # BLD: 4.03 M/UL — LOW (ref 4.2–5.8)
RBC # FLD: 13.2 % — SIGNIFICANT CHANGE UP (ref 10.3–14.5)
SODIUM SERPL-SCNC: 143 MMOL/L — SIGNIFICANT CHANGE UP (ref 135–145)
WBC # BLD: 5.05 K/UL — SIGNIFICANT CHANGE UP (ref 3.8–10.5)
WBC # FLD AUTO: 5.05 K/UL — SIGNIFICANT CHANGE UP (ref 3.8–10.5)

## 2023-03-04 PROCEDURE — 82435 ASSAY OF BLOOD CHLORIDE: CPT

## 2023-03-04 PROCEDURE — 82803 BLOOD GASES ANY COMBINATION: CPT

## 2023-03-04 PROCEDURE — 99233 SBSQ HOSP IP/OBS HIGH 50: CPT

## 2023-03-04 PROCEDURE — 84295 ASSAY OF SERUM SODIUM: CPT

## 2023-03-04 PROCEDURE — 86850 RBC ANTIBODY SCREEN: CPT

## 2023-03-04 PROCEDURE — 70553 MRI BRAIN STEM W/O & W/DYE: CPT

## 2023-03-04 PROCEDURE — 86140 C-REACTIVE PROTEIN: CPT

## 2023-03-04 PROCEDURE — 36227 PLACE CATH XTRNL CAROTID: CPT

## 2023-03-04 PROCEDURE — 85027 COMPLETE CBC AUTOMATED: CPT

## 2023-03-04 PROCEDURE — 85730 THROMBOPLASTIN TIME PARTIAL: CPT

## 2023-03-04 PROCEDURE — C1887: CPT

## 2023-03-04 PROCEDURE — 70498 CT ANGIOGRAPHY NECK: CPT | Mod: MA

## 2023-03-04 PROCEDURE — 0042T: CPT | Mod: MA

## 2023-03-04 PROCEDURE — 86900 BLOOD TYPING SEROLOGIC ABO: CPT

## 2023-03-04 PROCEDURE — 97165 OT EVAL LOW COMPLEX 30 MIN: CPT

## 2023-03-04 PROCEDURE — 99285 EMERGENCY DEPT VISIT HI MDM: CPT

## 2023-03-04 PROCEDURE — 84484 ASSAY OF TROPONIN QUANT: CPT

## 2023-03-04 PROCEDURE — 80048 BASIC METABOLIC PNL TOTAL CA: CPT

## 2023-03-04 PROCEDURE — 85018 HEMOGLOBIN: CPT

## 2023-03-04 PROCEDURE — 80061 LIPID PANEL: CPT

## 2023-03-04 PROCEDURE — 82330 ASSAY OF CALCIUM: CPT

## 2023-03-04 PROCEDURE — 70450 CT HEAD/BRAIN W/O DYE: CPT

## 2023-03-04 PROCEDURE — 82947 ASSAY GLUCOSE BLOOD QUANT: CPT

## 2023-03-04 PROCEDURE — 83036 HEMOGLOBIN GLYCOSYLATED A1C: CPT

## 2023-03-04 PROCEDURE — 70450 CT HEAD/BRAIN W/O DYE: CPT | Mod: 26

## 2023-03-04 PROCEDURE — 86803 HEPATITIS C AB TEST: CPT

## 2023-03-04 PROCEDURE — C1894: CPT

## 2023-03-04 PROCEDURE — 36226 PLACE CATH VERTEBRAL ART: CPT

## 2023-03-04 PROCEDURE — 85014 HEMATOCRIT: CPT

## 2023-03-04 PROCEDURE — 36415 COLL VENOUS BLD VENIPUNCTURE: CPT

## 2023-03-04 PROCEDURE — 96374 THER/PROPH/DIAG INJ IV PUSH: CPT

## 2023-03-04 PROCEDURE — C1769: CPT

## 2023-03-04 PROCEDURE — 36224 PLACE CATH CAROTD ART: CPT

## 2023-03-04 PROCEDURE — 83605 ASSAY OF LACTIC ACID: CPT

## 2023-03-04 PROCEDURE — 80053 COMPREHEN METABOLIC PANEL: CPT

## 2023-03-04 PROCEDURE — 97162 PT EVAL MOD COMPLEX 30 MIN: CPT

## 2023-03-04 PROCEDURE — 85025 COMPLETE CBC W/AUTO DIFF WBC: CPT

## 2023-03-04 PROCEDURE — 87635 SARS-COV-2 COVID-19 AMP PRB: CPT

## 2023-03-04 PROCEDURE — 92523 SPEECH SOUND LANG COMPREHEN: CPT

## 2023-03-04 PROCEDURE — 85610 PROTHROMBIN TIME: CPT

## 2023-03-04 PROCEDURE — 70496 CT ANGIOGRAPHY HEAD: CPT | Mod: MA

## 2023-03-04 PROCEDURE — C1760: CPT

## 2023-03-04 PROCEDURE — 84132 ASSAY OF SERUM POTASSIUM: CPT

## 2023-03-04 PROCEDURE — 82962 GLUCOSE BLOOD TEST: CPT

## 2023-03-04 PROCEDURE — 86901 BLOOD TYPING SEROLOGIC RH(D): CPT

## 2023-03-04 PROCEDURE — 85652 RBC SED RATE AUTOMATED: CPT

## 2023-03-04 RX ORDER — ASPIRIN/CALCIUM CARB/MAGNESIUM 324 MG
1 TABLET ORAL
Qty: 30 | Refills: 0
Start: 2023-03-04 | End: 2023-04-02

## 2023-03-04 RX ORDER — ATORVASTATIN CALCIUM 80 MG/1
1 TABLET, FILM COATED ORAL
Qty: 0 | Refills: 0 | DISCHARGE

## 2023-03-04 RX ORDER — ATORVASTATIN CALCIUM 80 MG/1
1 TABLET, FILM COATED ORAL
Qty: 30 | Refills: 0
Start: 2023-03-04 | End: 2023-04-02

## 2023-03-04 RX ORDER — AMLODIPINE BESYLATE 2.5 MG/1
1 TABLET ORAL
Qty: 30 | Refills: 0
Start: 2023-03-04 | End: 2023-04-02

## 2023-03-04 RX ORDER — ASPIRIN/CALCIUM CARB/MAGNESIUM 324 MG
1 TABLET ORAL
Qty: 0 | Refills: 0 | DISCHARGE

## 2023-03-04 RX ORDER — ASPIRIN/CALCIUM CARB/MAGNESIUM 324 MG
81 TABLET ORAL DAILY
Refills: 0 | Status: DISCONTINUED | OUTPATIENT
Start: 2023-03-04 | End: 2023-03-04

## 2023-03-04 RX ADMIN — AMLODIPINE BESYLATE 2.5 MILLIGRAM(S): 2.5 TABLET ORAL at 06:26

## 2023-03-04 RX ADMIN — Medication 81 MILLIGRAM(S): at 11:14

## 2023-03-04 RX ADMIN — SODIUM CHLORIDE 75 MILLILITER(S): 9 INJECTION INTRAMUSCULAR; INTRAVENOUS; SUBCUTANEOUS at 07:07

## 2023-03-04 RX ADMIN — SODIUM CHLORIDE 75 MILLILITER(S): 9 INJECTION INTRAMUSCULAR; INTRAVENOUS; SUBCUTANEOUS at 07:13

## 2023-03-04 NOTE — DISCHARGE NOTE PROVIDER - CARE PROVIDERS DIRECT ADDRESSES
[email or web address]

## 2023-03-04 NOTE — DISCHARGE NOTE PROVIDER - NSDCMRMEDTOKEN_GEN_ALL_CORE_FT
amLODIPine 2.5 mg oral tablet: 1 tab(s) orally once a day  aspirin 81 mg oral tablet, chewable: 1 tab(s) orally once a day  Lipitor 10 mg oral tablet: 1 tab(s) orally once a day  occupational Therapy: occupational therapy:  Dx: Right thalamocapsular IPH  increase strength, balance, and endurance to improve all functional mobility.  Physical therapy: Physical  therapy:  Dx: Right thalamocapsular IPH  increase strength, balance, and endurance to improve all functional mobility.  Rolling walker: Rolling Walker  Dx: Right thalamocapsular IPH

## 2023-03-04 NOTE — DISCHARGE NOTE PROVIDER - HOSPITAL COURSE
61 y/o RH male PMHx kidney cancer (now has one kidney), prior stroke (unknown residual deficits), HTN presenting with left temporal, LUE, LLE numbness and heaviness, LKW 3/2/23 00;00. Sx started abruptly. Patient denies HA, vision changes, dizziness. Patient visiting from [place]. Takes ASA 81 mg daily and Lipitor 10 mg daily.     General: No acute distress  HEENT: EOM intact, visual fields full  Abdomen: Soft, nontender, nondistended   Extremities: No edema    NEUROLOGICAL EXAM:  Mental status: Awake, alert, oriented x3, fluent speech, no neglect, able to follow commands  Cranial Nerves: No facial asymmetry, no nystagmus, no dysarthria,  tongue midline  Motor exam: Normal tone, no drift, RUE 5/5, RLE 5/5, LUE 5/5, LLE 5/5.  Sensation: Intact to light touch   Coordination/ Gait: No dysmetria      Imaging  CTH (03/04/23): Stable small right thalamocapsular intraparenchymal hematoma. Minimal  mass effect without midline shift.    Cerebral angiogram (03/03/23): right thalamo-capsular parenchymal hemorrhage  no source for hemorrhage     CTH (03/02/23): High attenuation focus in the distal limb of the right internal capsule measuring slightly larger compared with earlier the same day.     MRI HEAD W/ contrast 03/02/23:: Similar-appearing 5 to 6 mm acute right thalamocapsular parenchymal hemorrhage and small surrounding edema, given differences in modality. No underlying enhancement. No abnormal parenchymal or leptomeningeal enhancement elsewhere in the brain. The presence of hemorrhage limits evaluation for underlying restricted diffusion. No abnormal restricted diffusion elsewhere in the brain.  Small foci of susceptibility artifact in the right thalamus (adjacent to the acute hemorrhage) and left posterior frontal opercular region. These may represent chronic microhemorrhages and/or tiny cavernomas.    CT HEAD (03/02/23: Hyperdensity within the right posterior limb of the internal capsule suggestive of acute intraparenchymal hemorrhage. No significant mass effect or midline shift. Punctate faint hyperattenuation in the anterior frontal region may  reflect tiny mineralization, minimal hemorrhage, or underlying lesion such as cavernous malformation.    CT angiography neck (03/02/23): No hemodynamically significant stenosis by NASCET criteria. No vascular dissection.    CT angiography brain (03/02/23): Right posterior cerebral artery markedly diminutive and irregular possibly reflecting vasospasm, vasculitis, or possible stenosis. Mild luminal irregularity of the left proximal posterior   cerebral artery. Otherwise no major vessel occlusion or proximal stenosis. No evidence of aneurysm or other vascular malformation.    CT perfusion (03/02/23): No core infarct or at risk territory.        Impression: Right thalamocapsular parenchymal hemorrhage etiology chronic HTN vs underlying malformation    Pt was started on ASA for secondary stroke prevention due to hx of prior ischemic infarct and Right posterior cerebral artery stenosis    Pt instructed to continue fluid intake due to elevated BUN post contrast    Pt was evaluated by Hematology due to hx of renal cancer: Recommendations for outpatient follow up and PET/CT scan.      Pt was evaluated by PT/OT with recommendations for d/c home with oupt PT, no need for OT services    Pt medically stable for d/c home. Plan goals of care discussed wit pt's son and pt at bedside.

## 2023-03-04 NOTE — PROGRESS NOTE ADULT - SUBJECTIVE AND OBJECTIVE BOX
THE PATIENT WAS SEEN AND EXAMINED BY ME WITH THE HOUSESTAFF AND STROKE TEAM DURING MORNING ROUNDS.   HPI:  89 y/o RH male PMHx kidney cancer (now has one kidney), prior stroke (unknown residual deficits), HTN presenting with left temporal, LUE, LLE numbness and heaviness, LKW 3/2/23 00;00. Sx started abruptly. Patient denies HA, vision changes, dizziness. Patient visiting from [place]. Takes ASA 81 mg daily and Lipitor 10 mg daily.     NIHSS:3  premrs;0  ICH:0  not a tenecteplase candidate given hemorrhage on CTH.  not a thrombectomy given no LVO.     SUBJECTIVE: No events overnight.  No new neurologic complaints, ROS reported negative unless otherwise noted.      amLODIPine   Tablet 2.5 milliGRAM(s) Oral daily  atorvastatin 10 milliGRAM(s) Oral at bedtime  influenza   Vaccine 0.5 milliLiter(s) IntraMuscular once  sodium chloride 0.9%. 1000 milliLiter(s) IV Continuous <Continuous>      PHYSICAL EXAM:   Vital Signs Last 24 Hrs  T(C): 37 (03 Mar 2023 20:00), Max: 37.3 (03 Mar 2023 11:05)  T(F): 98.6 (03 Mar 2023 20:00), Max: 99.1 (03 Mar 2023 11:05)  HR: 94 (04 Mar 2023 08:00) (55 - 94)  BP: 127/70 (04 Mar 2023 08:00) (100/66 - 137/77)  BP(mean): 89 (04 Mar 2023 08:00) (75 - 101)  RR: 20 (04 Mar 2023 08:00) (14 - 20)  SpO2: 99% (04 Mar 2023 08:00) (96% - 100%)    Parameters below as of 04 Mar 2023 08:00  Patient On (Oxygen Delivery Method): room air        General: No acute distress  HEENT: EOM intact, visual fields full  Abdomen: Soft, nontender, nondistended   Extremities: No edema    NEUROLOGICAL EXAM:  Mental status: Awake, alert, oriented x3, no aphasia, no neglect, normal memory   Cranial Nerves: No facial asymmetry, no nystagmus, no dysarthria,  tongue midline  Motor exam: Normal tone, no drift, 5/5 RUE, 5/5 RLE, 5/5 LUE, 5/5 LLE, normal fine finger movements.  Sensation: Intact to light touch   Coordination/ Gait: No dysmetria, KAI intact and symmetric bilaterally    LABS:                        12.4   5.05  )-----------( 160      ( 04 Mar 2023 06:46 )             38.9    03-04    143  |  111<H>  |  27<H>  ----------------------------<  92  4.5   |  23  |  1.66<H>    Ca    8.8      04 Mar 2023 06:44    PT/INR - ( 03 Mar 2023 06:48 )   PT: 12.2 sec;   INR: 1.06 ratio         PTT - ( 03 Mar 2023 06:48 )  PTT:32.5 sec      IMAGING: Reviewed by me.      THE PATIENT WAS SEEN AND EXAMINED BY ME WITH THE HOUSESTAFF AND STROKE TEAM DURING MORNING ROUNDS.   HPI:  89 y/o RH male PMHx kidney cancer (now has one kidney), prior stroke (unknown residual deficits), presenting with left temporal, LUE, LLE numbness and heaviness, LKW 3/2/23 00;00. Sx started abruptly. Patient denies HA, vision changes, dizziness. Patient visiting from [place]. Takes ASA 81 mg daily and Lipitor 10 mg daily.     NIHSS:3  premrs;0  ICH:0  not a tenecteplase candidate given hemorrhage on CTH.  not a thrombectomy given no LVO.     SUBJECTIVE: No events overnight.  No new neurologic complaints, ROS reported negative unless otherwise noted.      amLODIPine   Tablet 2.5 milliGRAM(s) Oral daily  atorvastatin 10 milliGRAM(s) Oral at bedtime  influenza   Vaccine 0.5 milliLiter(s) IntraMuscular once  sodium chloride 0.9%. 1000 milliLiter(s) IV Continuous <Continuous>      PHYSICAL EXAM:   Vital Signs Last 24 Hrs  T(C): 37 (03 Mar 2023 20:00), Max: 37.3 (03 Mar 2023 11:05)  T(F): 98.6 (03 Mar 2023 20:00), Max: 99.1 (03 Mar 2023 11:05)  HR: 94 (04 Mar 2023 08:00) (55 - 94)  BP: 127/70 (04 Mar 2023 08:00) (100/66 - 137/77)  BP(mean): 89 (04 Mar 2023 08:00) (75 - 101)  RR: 20 (04 Mar 2023 08:00) (14 - 20)  SpO2: 99% (04 Mar 2023 08:00) (96% - 100%)    Parameters below as of 04 Mar 2023 08:00  Patient On (Oxygen Delivery Method): room air        General: No acute distress  HEENT: EOM intact, visual fields full  Abdomen: Soft, nontender, nondistended   Extremities: No edema    NEUROLOGICAL EXAM:  Mental status: Awake, alert, oriented x3, no aphasia, no neglect, normal memory   Cranial Nerves: No facial asymmetry, no nystagmus, no dysarthria,  tongue midline  Motor exam: Normal tone, no drift, 5/5 RUE, 5/5 RLE, 5/5 LUE, 5/5 LLE, normal fine finger movements.  Sensation: Subjective numbness left face, left lower extremity > upper   Coordination/ Gait: No dysmetria, KAI intact and symmetric bilaterally    LABS:                        12.4   5.05  )-----------( 160      ( 04 Mar 2023 06:46 )             38.9    03-04    143  |  111<H>  |  27<H>  ----------------------------<  92  4.5   |  23  |  1.66<H>    Ca    8.8      04 Mar 2023 06:44    PT/INR - ( 03 Mar 2023 06:48 )   PT: 12.2 sec;   INR: 1.06 ratio         PTT - ( 03 Mar 2023 06:48 )  PTT:32.5 sec      IMAGING: Reviewed by me.

## 2023-03-04 NOTE — PROGRESS NOTE ADULT - ASSESSMENT
61 y/o RH male PMHx kidney cancer (now has one kidney), prior stroke (unknown residual deficits), HTN presenting with left temporal, LUE, LLE numbness and heaviness, LKW 3/2/23 00;00. Sx started abruptly. Patient denies HA, vision changes, dizziness. Patient visiting from [place]. Takes ASA 81 mg daily and Lipitor 10 mg daily.     Impression: Right thalamocapsular parenchymal hemorrhage etiology chronic HTN vs underlying malformation    NEURO: Continue close monitoring for neurologic deterioration, goal SBP < 140, s/p cerebral angiogram: negative for source of hemorrhage, LDL 66: continue home dose statin, MRI Brain w/ noted above. Plan to obtain repeat neuroimaging in 4-6 weeks  to r/o any underlying tissue pathology once IPH would be resolved. 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 Repeat CT head is stable    PULMONARY: Protecting airway, saturating well     CARDIOVASCULAR: TTE as outpt, cardiac monitoring no events. Pt on amlodipine 5mg for BP management, Dr Das (cardio) consult appreciated                             SBP goal: 110-140mmHg    GASTROINTESTINAL:  Dysphagia screen-passed, tolerating diet,     Diet:  Regular    RENAL: BUN/Cr 27/1.77, will encourage hydration, started on fluids good urine output      Na Goal: Greater than 135     Nichole: No    HEMATOLOGY: H/H stable: anemia, Platelets 160, Hematology consulted for cancer history: Recommendations for outpatient follow up and PET/CT scan.     DVT ppx: venodynes, no need to start LMWH as pt is to be d/c today 03/04/23,     ID: afebrile, no leukocytosis     OTHER: Plan of care discussed with patient and pt's son at bedside.     DISPOSITION: Home with outpt PT as per PT/OT eval. Pt medially stable for d/c, medical work up completed      CORE MEASURES:        Admission NIHSS: 3     TPA:  NO      LDL/HDL: 66/52     Depression Screen: 0     Statin Therapy: Y     Dysphagia Screen: PASS     Smoking NO      Afib NO     Stroke Education YES     Obtain screening lower extremity venous ultrasound in patients who meet 1 or more of the following criteria as patient is high risk for DVT/PE on admission:   [] History of DVT/PE  []Hypercoagulable states (Factor V Leiden, Cancer, OCP, etc. )  []Prolonged immobility (hemiplegia/hemiparesis/post operative or any other extended immobilization)  [] Transferred from outside facility (Rehab or Long term care)  [] Age </= to 50.

## 2023-03-04 NOTE — PROGRESS NOTE ADULT - SUBJECTIVE AND OBJECTIVE BOX
THE PATIENT WAS SEEN AND EXAMINED BY ME WITH THE HOUSESTAFF AND STROKE TEAM DURING MORNING ROUNDS.   HPI:  63 y/o RH male PMHx kidney cancer (now has one kidney), prior stroke (unknown residual deficits), HTN presenting with left temporal, LUE, LLE numbness and heaviness, LKW 3/2/23 00;00. Sx started abruptly. Patient denies HA, vision changes, dizziness. Patient visiting from [place]. Takes ASA 81 mg daily and Lipitor 10 mg daily. NIHSS:3, premrs;0, ICH:0, not a tenecteplase candidate given hemorrhage on CTH. Not a thrombectomy given no LVO.      SUBJECTIVE: No events overnight.  No new neurologic complaints, ROS reported negative unless otherwise noted.      amLODIPine   Tablet 2.5 milliGRAM(s) Oral daily  aspirin  chewable 81 milliGRAM(s) Oral daily  atorvastatin 10 milliGRAM(s) Oral at bedtime  influenza   Vaccine 0.5 milliLiter(s) IntraMuscular once  sodium chloride 0.9%. 1000 milliLiter(s) IV Continuous <Continuous>      PHYSICAL EXAM:   Vital Signs Last 24 Hrs  T(C): 36.8 (04 Mar 2023 10:15), Max: 37 (03 Mar 2023 20:00)  T(F): 98.3 (04 Mar 2023 10:15), Max: 98.6 (03 Mar 2023 20:00)  HR: 81 (04 Mar 2023 12:00) (62 - 94)  BP: 109/68 (04 Mar 2023 12:00) (104/61 - 131/65)  BP(mean): 80 (04 Mar 2023 12:00) (75 - 101)  RR: 19 (04 Mar 2023 12:00) (14 - 20)  SpO2: 99% (04 Mar 2023 12:00) (96% - 99%)    Parameters below as of 04 Mar 2023 12:00  Patient On (Oxygen Delivery Method): room air        General: No acute distress  HEENT: EOM intact, visual fields full  Abdomen: Soft, nontender, nondistended   Extremities: No edema    NEUROLOGICAL EXAM: Mandarin translation provided by Pt's son at bedside.   Mental status: Eyes open, awake, alert, oriented x3, fluent speech, no neglect, able to follow commands   Cranial Nerves: No facial asymmetry, no nystagmus, no dysarthria.  Motor exam: Normal tone, no drift, RUE 5/5, RLE 5/5, LUE 5/5, LLE 5/5  Sensation: LUE decreased sensation to light touch   Coordination/ Gait: No dysmetria.      LABS:                        12.4   5.05  )-----------( 160      ( 04 Mar 2023 06:46 )             38.9    03-04    143  |  111<H>  |  27<H>  ----------------------------<  92  4.5   |  23  |  1.66<H>    Ca    8.8      04 Mar 2023 06:44    PT/INR - ( 03 Mar 2023 06:48 )   PT: 12.2 sec;   INR: 1.06 ratio         PTT - ( 03 Mar 2023 06:48 )  PTT:32.5 sec      IMAGING: Reviewed by me.         CTH (03/04/23): Stable small right thalamocapsular intraparenchymal hematoma. Minimal  mass effect without midline shift.    Cerebral angiogram (03/03/23): right thalamo-capsular parenchymal hemorrhage  no source for hemorrhage     CTH (03/02/23): High attenuation focus in the distal limb of the right internal capsule measuring slightly larger compared with earlier the same day.     MRI HEAD W/ contrast 03/02/23:: Similar-appearing 5 to 6 mm acute right thalamocapsular parenchymal hemorrhage and small surrounding edema, given differences in modality. No underlying enhancement. No abnormal parenchymal or leptomeningeal enhancement elsewhere in the brain. The presence of hemorrhage limits evaluation for underlying restricted diffusion. No abnormal restricted diffusion elsewhere in the brain.  Small foci of susceptibility artifact in the right thalamus (adjacent to the acute hemorrhage) and left posterior frontal opercular region. These may represent chronic microhemorrhages and/or tiny cavernomas.    CT HEAD (03/02/23: Hyperdensity within the right posterior limb of the internal capsule suggestive of acute intraparenchymal hemorrhage. No significant mass effect or midline shift. Punctate faint hyperattenuation in the anterior frontal region may  reflect tiny mineralization, minimal hemorrhage, or underlying lesion such as cavernous malformation.    CT angiography neck (03/02/23): No hemodynamically significant stenosis by NASCET criteria. No vascular dissection.    CT angiography brain (03/02/23): Right posterior cerebral artery markedly diminutive and irregular possibly reflecting vasospasm, vasculitis, or possible stenosis. Mild luminal irregularity of the left proximal posterior   cerebral artery. Otherwise no major vessel occlusion or proximal stenosis. No evidence of aneurysm or other vascular malformation.    CT perfusion (03/02/23): No core infarct or at risk territory.

## 2023-03-04 NOTE — PROGRESS NOTE ADULT - ASSESSMENT
63 y/o RH male PMHx kidney cancer (now has one kidney), prior stroke (unknown residual deficits), HTN presenting with left temporal, LUE, LLE numbness and heaviness, LKW 3/2/23 00;00. Sx started abruptly. Patient denies HA, vision changes, dizziness. Patient visiting from [place]. Takes ASA 81 mg daily and Lipitor 10 mg daily.     Impression: right thalamocapsular parenchymal hemorrhage etiology chronic HTN vs underlying malformation    NEURO: Continue close monitoring for neurologic deterioration, goal SBP < 140, s/p cerebral angiogram: negative for source of hemorrhage, LDL 66: continue home dose statin, MRI Brain w/ noted above.  Physical therapy/OT: home    ANTITHROMBOTIC THERAPY: none due to ICH, plan to start ASA in 1 week after stable CT HEAD    PULMONARY: CXR clear, protecting airway, saturating well     CARDIOVASCULAR: check TTE, cardiac monitoring                              SBP goal: < 140    GASTROINTESTINAL:  dysphagia screen-passed, tolerating diet,     Diet:  Regular    RENAL: BUN/Cr 27/1.77, will encourage hydration, started on fluids good urine output      Na Goal: Greater than 135     Nichole: No    HEMATOLOGY: H/H stable, Platelets 167, Hematology consulted for cancer history and will follow as outpatient for work up.     DVT ppx: venodynes, plan to start chemical DVT PPX on 03/04 if ct stable    ID: afebrile, no leukocytosis     OTHER: Plan of care discussed with patient and son at bedside.     DISPOSITION: Home once stable and workup is complete      CORE MEASURES:        Admission NIHSS:      TPA: [] YES [] NO      LDL/HDL:     Depression Screen:      Statin Therapy:     Dysphagia Screen: [] PASS [] FAIL     Smoking [] YES [] NO      Afib [] YES [] NO     Stroke Education [] YES [] NO    Obtain screening lower extremity venous ultrasound in patients who meet 1 or more of the following criteria as patient is high risk for DVT/PE on admission:   [] History of DVT/PE  []Hypercoagulable states (Factor V Leiden, Cancer, OCP, etc. )  []Prolonged immobility (hemiplegia/hemiparesis/post operative or any other extended immobilization)  [] Transferred from outside facility (Rehab or Long term care)  [] Age </= to 50. 61 y/o RH male PMHx kidney cancer (now has one kidney), prior stroke (unknown residual deficits), presenting with left temporal, LUE, LLE numbness and heaviness, LKW 3/2/23 00;00. Sx started abruptly. Patient denies HA, vision changes, dizziness. Patient visiting from [place]. Takes ASA 81 mg daily and Lipitor 10 mg daily.     Impression: Right thalamocapsular parenchymal hemorrhage etiology new onset HTN vs underlying brain lesion    NEURO: Continue close monitoring for neurologic deterioration, goal SBP < 140, s/p cerebral angiogram: negative for source of hemorrhage or DVA that may confirmed cavernoma, LDL 66: continue home dose statin, MRI Brain w/ noted above.  Physical therapy/OT: home    ANTITHROMBOTIC THERAPY: Re-start ASA today (Repeat CT stable hemorrhage) for secondary stroke prevention measures given prior history of CRAO right eye and right posterior cerebral artery P1 segment occlusion as per history and evidenced in cerebral angiogram.     PULMONARY: CXR clear, protecting airway, saturating well     CARDIOVASCULAR: Will need outpatient echo                             SBP goal: < 140    GASTROINTESTINAL:  dysphagia screen-passed, tolerating diet,     Diet:  Regular    RENAL: BUN/Cr 27/1.77, will encourage hydration, started on fluids good urine output      Na Goal: Greater than 135     Nichole: No    HEMATOLOGY: H/H stable, Platelets 167, Hematology consulted for cancer history and will follow as outpatient for work up.     DVT ppx: venodynes, plan to start chemical DVT PPX on 03/04 if ct stable    ID: afebrile, no leukocytosis     OTHER: Plan of care discussed with patient and son at bedside.     DISPOSITION: Home once stable and workup is complete      CORE MEASURES:        Admission NIHSS:      TPA: [] YES [] NO      LDL/HDL:     Depression Screen:      Statin Therapy:     Dysphagia Screen: [] PASS [] FAIL     Smoking [] YES [] NO      Afib [] YES [] NO     Stroke Education [] YES [] NO    Obtain screening lower extremity venous ultrasound in patients who meet 1 or more of the following criteria as patient is high risk for DVT/PE on admission:   [] History of DVT/PE  []Hypercoagulable states (Factor V Leiden, Cancer, OCP, etc. )  []Prolonged immobility (hemiplegia/hemiparesis/post operative or any other extended immobilization)  [] Transferred from outside facility (Rehab or Long term care)  [] Age </= to 50.

## 2023-03-04 NOTE — DISCHARGE NOTE PROVIDER - CARE PROVIDER_API CALL
Corazon Jaeger)  Neurology; Vascular Neurology  [address], [address]  [place] [address]  Phone: [phone]  Fax: [phone]  Follow Up Time:     Rosalina Miranda (DO)  Hematology; Medical Oncology  [address] [place] [address]  Phone: [phone]  Fax: [phone]  Follow Up Time:     Rupert Das (DO)  Cardiology; Internal Medicine  [address], [address]  [place] [address]  Phone: [phone]  Fax: [phone]  Follow Up Time:

## 2023-03-04 NOTE — DISCHARGE NOTE PROVIDER - NSDCCPCAREPLAN_GEN_ALL_CORE_FT
PRINCIPAL DISCHARGE DIAGNOSIS  Diagnosis: Intraparenchymal hemorrhage of brain  Assessment and Plan of Treatment: .Please follow up with neurologist in 1 week and oncologist for for outpatient follow up and to obtain PET/CT scan.. Continue taking medications as prescribed. Monitor your blood pressure. Reduce fat, cholesterol and salt in your diet. Increase intake of fruits and vegetables. Limit alcohol to minimum and do not smoke. You may be at risk for falling, make changes to your home to help you walk easier. Keep up to date on vaccinations.  If you experience any symptoms of facial drooping, slurred speech, arm or leg weakness, severe headache, vision changes or any worsening symptoms, notify provider immediatley and return to ER.

## 2023-03-04 NOTE — PROGRESS NOTE ADULT - TIME BILLING
Advanced care planning was discussed with patient and family.  Advanced care planning forms were reviewed and discussed as appropriate.  Differential diagnosis and plan of care discussed with patient after the evaluation.   Pain assessed and judicious use of narcotics when appropriate was discussed.  Importance of Fall prevention discussed.  Counseling on Smoking and Alcohol cessation was offered when appropriate.  Counseling on Diet, exercise, and medication compliance was done.
Agree with plan as detailed above.

## 2023-03-04 NOTE — DISCHARGE NOTE NURSING/CASE MANAGEMENT/SOCIAL WORK - PATIENT PORTAL LINK FT
You can access the FollowMyHealth Patient Portal offered by [facility] by registering at the following website: [email or web address]. By joining [facility]’s FollowMyHealth portal, you will also be able to view your health information using other applications (apps) compatible with our system.

## 2023-03-04 NOTE — DISCHARGE NOTE PROVIDER - PROVIDER TOKENS
PROVIDER:[TOKEN:[[id number]]],PROVIDER:[TOKEN:[[id number]]],PROVIDER:[TOKEN:[[id number]]]

## 2023-03-06 ENCOUNTER — EMERGENCY (EMERGENCY)
Facility: HOSPITAL | Age: 63
LOS: 1 days | Discharge: HOME | End: 2023-03-06
Attending: EMERGENCY MEDICINE
Payer: COMMERCIAL

## 2023-03-06 VITALS
TEMPERATURE: 98 F | SYSTOLIC BLOOD PRESSURE: 174 MMHG | HEIGHT: 60 IN | WEIGHT: 125.66 LBS | OXYGEN SATURATION: 100 % | RESPIRATION RATE: 16 BRPM | DIASTOLIC BLOOD PRESSURE: 80 MMHG | HEART RATE: 69 BPM

## 2023-03-06 VITALS
HEART RATE: 75 BPM | TEMPERATURE: 98 F | DIASTOLIC BLOOD PRESSURE: 81 MMHG | OXYGEN SATURATION: 99 % | SYSTOLIC BLOOD PRESSURE: 122 MMHG | RESPIRATION RATE: 16 BRPM

## 2023-03-06 DIAGNOSIS — Z90.5 ACQUIRED ABSENCE OF KIDNEY: Chronic | ICD-10-CM

## 2023-03-06 PROBLEM — H91.90 UNSPECIFIED HEARING LOSS, UNSPECIFIED EAR: Chronic | Status: ACTIVE | Noted: 2023-03-02

## 2023-03-06 PROBLEM — K80.20 CALCULUS OF GALLBLADDER WITHOUT CHOLECYSTITIS WITHOUT OBSTRUCTION: Chronic | Status: ACTIVE | Noted: 2023-03-02

## 2023-03-06 PROBLEM — N20.0 CALCULUS OF KIDNEY: Chronic | Status: ACTIVE | Noted: 2023-03-02

## 2023-03-06 PROBLEM — I10 ESSENTIAL (PRIMARY) HYPERTENSION: Chronic | Status: ACTIVE | Noted: 2023-03-02

## 2023-03-06 PROBLEM — H54.40 BLINDNESS, ONE EYE, UNSPECIFIED EYE: Chronic | Status: ACTIVE | Noted: 2023-03-02

## 2023-03-06 PROBLEM — I63.9 CEREBRAL INFARCTION, UNSPECIFIED: Chronic | Status: ACTIVE | Noted: 2023-03-02

## 2023-03-06 PROBLEM — C64.9 MALIGNANT NEOPLASM OF UNSPECIFIED KIDNEY, EXCEPT RENAL PELVIS: Chronic | Status: ACTIVE | Noted: 2023-03-02

## 2023-03-06 LAB
ALBUMIN SERPL ELPH-MCNC: 4.5 G/DL — SIGNIFICANT CHANGE UP (ref 3.3–5)
ALP SERPL-CCNC: 63 U/L — SIGNIFICANT CHANGE UP (ref 40–120)
ALT FLD-CCNC: 16 U/L — SIGNIFICANT CHANGE UP (ref 10–45)
ANION GAP SERPL CALC-SCNC: 12 MMOL/L — SIGNIFICANT CHANGE UP (ref 5–17)
APPEARANCE UR: CLEAR — SIGNIFICANT CHANGE UP
APTT BLD: 34.2 SEC — SIGNIFICANT CHANGE UP (ref 27.5–35.5)
AST SERPL-CCNC: 20 U/L — SIGNIFICANT CHANGE UP (ref 10–40)
BASE EXCESS BLDV CALC-SCNC: -0.1 MMOL/L — SIGNIFICANT CHANGE UP (ref -2–3)
BASOPHILS # BLD AUTO: 0.01 K/UL — SIGNIFICANT CHANGE UP (ref 0–0.2)
BASOPHILS NFR BLD AUTO: 0.2 % — SIGNIFICANT CHANGE UP (ref 0–2)
BILIRUB SERPL-MCNC: 0.8 MG/DL — SIGNIFICANT CHANGE UP (ref 0.2–1.2)
BILIRUB UR-MCNC: NEGATIVE — SIGNIFICANT CHANGE UP
BUN SERPL-MCNC: 21 MG/DL — SIGNIFICANT CHANGE UP (ref 7–23)
CA-I SERPL-SCNC: 1.26 MMOL/L — SIGNIFICANT CHANGE UP (ref 1.15–1.33)
CALCIUM SERPL-MCNC: 9.8 MG/DL — SIGNIFICANT CHANGE UP (ref 8.4–10.5)
CHLORIDE BLDV-SCNC: 105 MMOL/L — SIGNIFICANT CHANGE UP (ref 96–108)
CHLORIDE SERPL-SCNC: 106 MMOL/L — SIGNIFICANT CHANGE UP (ref 96–108)
CHOLEST SERPL-MCNC: 148 MG/DL — SIGNIFICANT CHANGE UP
CO2 BLDV-SCNC: 29 MMOL/L — HIGH (ref 22–26)
CO2 SERPL-SCNC: 23 MMOL/L — SIGNIFICANT CHANGE UP (ref 22–31)
COLOR SPEC: COLORLESS — SIGNIFICANT CHANGE UP
CREAT SERPL-MCNC: 1.49 MG/DL — HIGH (ref 0.5–1.3)
DIFF PNL FLD: NEGATIVE — SIGNIFICANT CHANGE UP
EGFR: 53 ML/MIN/1.73M2 — LOW
EOSINOPHIL # BLD AUTO: 0.18 K/UL — SIGNIFICANT CHANGE UP (ref 0–0.5)
EOSINOPHIL NFR BLD AUTO: 3.8 % — SIGNIFICANT CHANGE UP (ref 0–6)
FLUAV AG NPH QL: SIGNIFICANT CHANGE UP
FLUBV AG NPH QL: SIGNIFICANT CHANGE UP
GAS PNL BLDV: 137 MMOL/L — SIGNIFICANT CHANGE UP (ref 136–145)
GAS PNL BLDV: SIGNIFICANT CHANGE UP
GLUCOSE BLDV-MCNC: 95 MG/DL — SIGNIFICANT CHANGE UP (ref 70–99)
GLUCOSE SERPL-MCNC: 101 MG/DL — HIGH (ref 70–99)
GLUCOSE UR QL: NEGATIVE — SIGNIFICANT CHANGE UP
HCO3 BLDV-SCNC: 28 MMOL/L — SIGNIFICANT CHANGE UP (ref 22–29)
HCT VFR BLD CALC: 45.6 % — SIGNIFICANT CHANGE UP (ref 39–50)
HCT VFR BLDA CALC: 45 % — SIGNIFICANT CHANGE UP (ref 39–51)
HDLC SERPL-MCNC: 52 MG/DL — SIGNIFICANT CHANGE UP
HGB BLD CALC-MCNC: 14.9 G/DL — SIGNIFICANT CHANGE UP (ref 12.6–17.4)
HGB BLD-MCNC: 14.5 G/DL — SIGNIFICANT CHANGE UP (ref 13–17)
IMM GRANULOCYTES NFR BLD AUTO: 0.2 % — SIGNIFICANT CHANGE UP (ref 0–0.9)
INR BLD: 1.01 RATIO — SIGNIFICANT CHANGE UP (ref 0.88–1.16)
KETONES UR-MCNC: NEGATIVE — SIGNIFICANT CHANGE UP
LACTATE BLDV-MCNC: 1.1 MMOL/L — SIGNIFICANT CHANGE UP (ref 0.5–2)
LEUKOCYTE ESTERASE UR-ACNC: NEGATIVE — SIGNIFICANT CHANGE UP
LIPID PNL WITH DIRECT LDL SERPL: 72 MG/DL — SIGNIFICANT CHANGE UP
LYMPHOCYTES # BLD AUTO: 1.3 K/UL — SIGNIFICANT CHANGE UP (ref 1–3.3)
LYMPHOCYTES # BLD AUTO: 27.7 % — SIGNIFICANT CHANGE UP (ref 13–44)
MCHC RBC-ENTMCNC: 30.9 PG — SIGNIFICANT CHANGE UP (ref 27–34)
MCHC RBC-ENTMCNC: 31.8 GM/DL — LOW (ref 32–36)
MCV RBC AUTO: 97 FL — SIGNIFICANT CHANGE UP (ref 80–100)
MONOCYTES # BLD AUTO: 0.36 K/UL — SIGNIFICANT CHANGE UP (ref 0–0.9)
MONOCYTES NFR BLD AUTO: 7.7 % — SIGNIFICANT CHANGE UP (ref 2–14)
NEUTROPHILS # BLD AUTO: 2.84 K/UL — SIGNIFICANT CHANGE UP (ref 1.8–7.4)
NEUTROPHILS NFR BLD AUTO: 60.4 % — SIGNIFICANT CHANGE UP (ref 43–77)
NITRITE UR-MCNC: NEGATIVE — SIGNIFICANT CHANGE UP
NON HDL CHOLESTEROL: 96 MG/DL — SIGNIFICANT CHANGE UP
NRBC # BLD: 0 /100 WBCS — SIGNIFICANT CHANGE UP (ref 0–0)
PCO2 BLDV: 56 MMHG — HIGH (ref 42–55)
PH BLDV: 7.3 — LOW (ref 7.32–7.43)
PH UR: 5 — SIGNIFICANT CHANGE UP (ref 5–8)
PLATELET # BLD AUTO: 181 K/UL — SIGNIFICANT CHANGE UP (ref 150–400)
PO2 BLDV: 25 MMHG — SIGNIFICANT CHANGE UP (ref 25–45)
POTASSIUM BLDV-SCNC: 4.2 MMOL/L — SIGNIFICANT CHANGE UP (ref 3.5–5.1)
POTASSIUM SERPL-MCNC: 4.4 MMOL/L — SIGNIFICANT CHANGE UP (ref 3.5–5.3)
POTASSIUM SERPL-SCNC: 4.4 MMOL/L — SIGNIFICANT CHANGE UP (ref 3.5–5.3)
PROT SERPL-MCNC: 7.5 G/DL — SIGNIFICANT CHANGE UP (ref 6–8.3)
PROT UR-MCNC: NEGATIVE — SIGNIFICANT CHANGE UP
PROTHROM AB SERPL-ACNC: 11.6 SEC — SIGNIFICANT CHANGE UP (ref 10.5–13.4)
RBC # BLD: 4.7 M/UL — SIGNIFICANT CHANGE UP (ref 4.2–5.8)
RBC # FLD: 13.2 % — SIGNIFICANT CHANGE UP (ref 10.3–14.5)
RSV RNA NPH QL NAA+NON-PROBE: SIGNIFICANT CHANGE UP
SAO2 % BLDV: 27.7 % — LOW (ref 67–88)
SARS-COV-2 RNA SPEC QL NAA+PROBE: SIGNIFICANT CHANGE UP
SODIUM SERPL-SCNC: 141 MMOL/L — SIGNIFICANT CHANGE UP (ref 135–145)
SP GR SPEC: 1.01 — LOW (ref 1.01–1.02)
TRIGL SERPL-MCNC: 116 MG/DL — SIGNIFICANT CHANGE UP
TROPONIN T, HIGH SENSITIVITY RESULT: 18 NG/L — SIGNIFICANT CHANGE UP (ref 0–51)
TROPONIN T, HIGH SENSITIVITY RESULT: 19 NG/L — SIGNIFICANT CHANGE UP (ref 0–51)
UROBILINOGEN FLD QL: NEGATIVE — SIGNIFICANT CHANGE UP
WBC # BLD: 4.7 K/UL — SIGNIFICANT CHANGE UP (ref 3.8–10.5)
WBC # FLD AUTO: 4.7 K/UL — SIGNIFICANT CHANGE UP (ref 3.8–10.5)

## 2023-03-06 PROCEDURE — 99285 EMERGENCY DEPT VISIT HI MDM: CPT | Mod: 25

## 2023-03-06 PROCEDURE — 87637 SARSCOV2&INF A&B&RSV AMP PRB: CPT

## 2023-03-06 PROCEDURE — 84295 ASSAY OF SERUM SODIUM: CPT

## 2023-03-06 PROCEDURE — 99284 EMERGENCY DEPT VISIT MOD MDM: CPT

## 2023-03-06 PROCEDURE — 82435 ASSAY OF BLOOD CHLORIDE: CPT

## 2023-03-06 PROCEDURE — 80053 COMPREHEN METABOLIC PANEL: CPT

## 2023-03-06 PROCEDURE — 93005 ELECTROCARDIOGRAM TRACING: CPT

## 2023-03-06 PROCEDURE — 71045 X-RAY EXAM CHEST 1 VIEW: CPT

## 2023-03-06 PROCEDURE — 85014 HEMATOCRIT: CPT

## 2023-03-06 PROCEDURE — 70450 CT HEAD/BRAIN W/O DYE: CPT | Mod: MA

## 2023-03-06 PROCEDURE — 82947 ASSAY GLUCOSE BLOOD QUANT: CPT

## 2023-03-06 PROCEDURE — 85610 PROTHROMBIN TIME: CPT

## 2023-03-06 PROCEDURE — 82803 BLOOD GASES ANY COMBINATION: CPT

## 2023-03-06 PROCEDURE — 71045 X-RAY EXAM CHEST 1 VIEW: CPT | Mod: 26

## 2023-03-06 PROCEDURE — 70450 CT HEAD/BRAIN W/O DYE: CPT | Mod: 26,MA

## 2023-03-06 PROCEDURE — 36415 COLL VENOUS BLD VENIPUNCTURE: CPT

## 2023-03-06 PROCEDURE — 85018 HEMOGLOBIN: CPT

## 2023-03-06 PROCEDURE — 83605 ASSAY OF LACTIC ACID: CPT

## 2023-03-06 PROCEDURE — 84132 ASSAY OF SERUM POTASSIUM: CPT

## 2023-03-06 PROCEDURE — 82330 ASSAY OF CALCIUM: CPT

## 2023-03-06 PROCEDURE — 81003 URINALYSIS AUTO W/O SCOPE: CPT

## 2023-03-06 PROCEDURE — 80061 LIPID PANEL: CPT

## 2023-03-06 PROCEDURE — 85025 COMPLETE CBC W/AUTO DIFF WBC: CPT

## 2023-03-06 PROCEDURE — 85730 THROMBOPLASTIN TIME PARTIAL: CPT

## 2023-03-06 PROCEDURE — 84484 ASSAY OF TROPONIN QUANT: CPT

## 2023-03-06 RX ORDER — ASPIRIN/CALCIUM CARB/MAGNESIUM 324 MG
81 TABLET ORAL ONCE
Refills: 0 | Status: COMPLETED | OUTPATIENT
Start: 2023-03-06 | End: 2023-03-06

## 2023-03-06 RX ORDER — SODIUM CHLORIDE 9 MG/ML
1000 INJECTION INTRAMUSCULAR; INTRAVENOUS; SUBCUTANEOUS ONCE
Refills: 0 | Status: COMPLETED | OUTPATIENT
Start: 2023-03-06 | End: 2023-03-06

## 2023-03-06 RX ADMIN — SODIUM CHLORIDE 1000 MILLILITER(S): 9 INJECTION INTRAMUSCULAR; INTRAVENOUS; SUBCUTANEOUS at 11:41

## 2023-03-06 RX ADMIN — Medication 81 MILLIGRAM(S): at 15:52

## 2023-03-06 NOTE — ED PROVIDER NOTE - PROVIDER TOKENS
PROVIDER:[TOKEN:[[id number]],ESTABLISHEDPATIENT:[T]],PROVIDER:[TOKEN:[[id number]],ESTABLISHEDPATIENT:[T]]

## 2023-03-06 NOTE — CONSULT NOTE ADULT - SUBJECTIVE AND OBJECTIVE BOX
Neurology Consultation     HPI: Patient ALLISON is a 63 y/o RH male PMHx kidney cancer (now has one kidney), prior stroke, HTN, recent admission to stroke service for left temporal, LUE, LLE numbness and heaviness, LKW 3/2/23 00;00 found with small Rt thalamocapsular IPH and s/p cerebral angiogram 3/3/23 with no source for hemorrhage and deemed potentially due to chronic HTN vs underlying malformation. Was started on aspirin for secondary stroke prevention due to history of prior ischemic infarct and R PCA stenosis.       Plan was to obtain repeat neuroimaging in 4-6 weeks  to r/o any underlying tissue pathology once IPH would be resolved.    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    Kidney stone    Gall stones    Hard of hearing  LEFT    H/O kidney removal    FAMILY HISTORY:    SOCIAL HISTORY:      MEDICATIONS (HOME):  Home Medications:    MEDICATIONS  (STANDING):    MEDICATIONS  (PRN):    ALLERGIES/INTOLERANCES:  Allergies  No Known Allergies    Intolerances    VITALS & EXAMINATION:  Vital Signs Last 24 Hrs  T(C): 36.8 (06 Mar 2023 11:42), Max: 36.8 (06 Mar 2023 10:49)  T(F): 98.3 (06 Mar 2023 11:42), Max: 98.3 (06 Mar 2023 11:42)  HR: 65 (06 Mar 2023 11:42) (65 - 69)  BP: 139/76 (06 Mar 2023 11:42) (139/76 - 174/80)  BP(mean): --  RR: 16 (06 Mar 2023 11:42) (16 - 16)  SpO2: 100% (06 Mar 2023 11:42) (100% - 100%)    Parameters below as of 06 Mar 2023 11:42  Patient On (Oxygen Delivery Method): room air         LABORATORY:  CBC                       14.5   4.70  )-----------( 181      ( 06 Mar 2023 11:43 )             45.6     Chem 03-06    141  |  106  |  21  ----------------------------<  101<H>  4.4   |  23  |  1.49<H>    Ca    9.8      06 Mar 2023 11:43    TPro  7.5  /  Alb  4.5  /  TBili  0.8  /  DBili  x   /  AST  20  /  ALT  16  /  AlkPhos  63  03-06    LFTs LIVER FUNCTIONS - ( 06 Mar 2023 11:43 )  Alb: 4.5 g/dL / Pro: 7.5 g/dL / ALK PHOS: 63 U/L / ALT: 16 U/L / AST: 20 U/L / GGT: x           Coagulopathy PT/INR - ( 06 Mar 2023 11:43 )   PT: 11.6 sec;   INR: 1.01 ratio         PTT - ( 06 Mar 2023 11:43 )  PTT:34.2 sec  Lipid Panel 03-02 Chol 130 LDL -- HDL 52 Trig 55  A1c   Cardiac enzymes     U/A   CSF  Other    STUDIES & IMAGING: (EEG, CT, MR, U/S, TTE/NICK): Neurology Consultation     HPI: Patient ALLISON is a 61 y/o RH male PMHx kidney cancer (now has one kidney), prior stroke, HTN, recent admission to stroke service for left temporal, LUE, LLE numbness and heaviness, LKW 3/2/23 00;00 found with small Rt thalamocapsular IPH and s/p cerebral angiogram 3/3/23 with no source for hemorrhage and deemed potentially due to chronic HTN vs underlying malformation. Was started on aspirin for secondary stroke prevention due to history of prior ischemic infarct and R PCA stenosis.       Plan was to obtain repeat neuroimaging in 4-6 weeks  to r/o any underlying tissue pathology once IPH would be resolved.    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    Kidney stone    Gall stones    Hard of hearing  LEFT    H/O kidney removal    FAMILY HISTORY:    SOCIAL HISTORY:      MEDICATIONS (HOME):  Home Medications:    MEDICATIONS  (STANDING):    MEDICATIONS  (PRN):    ALLERGIES/INTOLERANCES:  Allergies  No Known Allergies    Intolerances    VITALS & EXAMINATION:  Vital Signs Last 24 Hrs  T(C): 36.8 (06 Mar 2023 11:42), Max: 36.8 (06 Mar 2023 10:49)  T(F): 98.3 (06 Mar 2023 11:42), Max: 98.3 (06 Mar 2023 11:42)  HR: 65 (06 Mar 2023 11:42) (65 - 69)  BP: 139/76 (06 Mar 2023 11:42) (139/76 - 174/80)  BP(mean): --  RR: 16 (06 Mar 2023 11:42) (16 - 16)  SpO2: 100% (06 Mar 2023 11:42) (100% - 100%)    Parameters below as of 06 Mar 2023 11:42  Patient On (Oxygen Delivery Method): room air         LABORATORY:  CBC                       14.5   4.70  )-----------( 181      ( 06 Mar 2023 11:43 )             45.6     Chem 03-06    141  |  106  |  21  ----------------------------<  101<H>  4.4   |  23  |  1.49<H>    Ca    9.8      06 Mar 2023 11:43    TPro  7.5  /  Alb  4.5  /  TBili  0.8  /  DBili  x   /  AST  20  /  ALT  16  /  AlkPhos  63  03-06    LFTs LIVER FUNCTIONS - ( 06 Mar 2023 11:43 )  Alb: 4.5 g/dL / Pro: 7.5 g/dL / ALK PHOS: 63 U/L / ALT: 16 U/L / AST: 20 U/L / GGT: x           Coagulopathy PT/INR - ( 06 Mar 2023 11:43 )   PT: 11.6 sec;   INR: 1.01 ratio         PTT - ( 06 Mar 2023 11:43 )  PTT:34.2 sec  Lipid Panel 03-02 Chol 130 LDL -- HDL 52 Trig 55  A1c   Cardiac enzymes     U/A   CSF  Other    STUDIES & IMAGING: (EEG, CT, MR, U/S, TTE/NICK):     CT head no con 3/4/23:  Stable small right thalamocapsular intraparenchymal hematoma. Minimal   mass effect without midline shift.  No acute transcortical infarction, new intracranial hemorrhage,   hydrocephalus, or extra-axial fluid collections.    MR head w/w/o con 3/4/23:     Similar-appearing 5 to 6 mm acute right thalamocapsular parenchymal   hemorrhage and small surrounding edema, given differences in modality.      No underlying enhancement. No abnormal parenchymal or leptomeningeal   enhancement elsewhere in the brain.    The presence of hemorrhage limits evaluation for underlying restricted   diffusion. No abnormal restricted diffusion elsewhere in the brain.    Small foci of susceptibility artifact in the right thalamus (adjacent to   the acute hemorrhage) and left posterior frontal opercular region. These   may represent chronic microhemorrhages and/or tiny cavernomas.      CTA head/neck 3/2/23:   Mild luminal   irregularity of the proximal left posterior cerebral artery P1 and P2   segments without stenosis or occlusion. The right posterior cerebral   artery is markedly diminutive and irregular beyond the proximal P1   segment, possibly reflecting vasospasm, vasculitis, possible stenosis.     Neurology Consultation     HPI: Patient ALLISON is a 61 y/o RH male PMHx kidney cancer (now has one kidney), prior stroke with residual R eye blindness, HTN, recent admission to stroke service for left face LUE, LLE numbness and heaviness found with small Rt thalamocapsular IPH and s/p cerebral angiogram 3/3/23 with no source for hemorrhage, who presents to ED for subjective acute on chronic worsening of L sided sensory symptoms. Patient accompanied by son and used [facility]  per preference ([id number] [facility]). They state patient on recent discharge had very mild LLE numbness predominantly. LKW yesterday 10:30 PM 3/5. Today AM woke up around 8AM 3/6 with notable subjective numbness of LLE up to LLQ of abdomen, LUE and L face particularly around ear region. Denies headaches, double vision, new vision changes, speech changes, word finding difficulty, worsening gait instability. Was started on aspirin for secondary stroke prevention due to history of prior ischemic infarct and R PCA stenosis. Did not take today 3/6 yet. At baseline has vision loss to even light in R eye per son. As per recent admission, the tentative plan was to obtain repeat neuroimaging in 4-6 weeks  to r/o any underlying tissue pathology once IPH would be resolved.    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    Kidney stone    Gall stones    Hard of hearing  LEFT    H/O kidney removal    FAMILY HISTORY:    SOCIAL HISTORY:      MEDICATIONS (HOME):  Home Medications:    MEDICATIONS  (STANDING):    MEDICATIONS  (PRN):    ALLERGIES/INTOLERANCES:  Allergies  No Known Allergies    Intolerances    VITALS & EXAMINATION:  Vital Signs Last 24 Hrs  T(C): 36.8 (06 Mar 2023 11:42), Max: 36.8 (06 Mar 2023 10:49)  T(F): 98.3 (06 Mar 2023 11:42), Max: 98.3 (06 Mar 2023 11:42)  HR: 65 (06 Mar 2023 11:42) (65 - 69)  BP: 139/76 (06 Mar 2023 11:42) (139/76 - 174/80)  BP(mean): --  RR: 16 (06 Mar 2023 11:42) (16 - 16)  SpO2: 100% (06 Mar 2023 11:42) (100% - 100%)    Parameters below as of 06 Mar 2023 11:42  Patient On (Oxygen Delivery Method): room air     General:  Constitutional: Male, appears stated age, NAD   Head: Normocephalic; Eyes: clear sclera;   Extremities: No cyanosis; Skin: No melinda edema of LE  Resp: breathing comfortably     Neurological (>12):  MS: Awake, alert. Oriented person place situation. Follows commands. Attends to examiner  Language: Speech is hypophonic, clear, fluent, good repetition, comprehension, registration of words.  CNs: PERRL. VFF except testing R eye with vision loss. EOMI no nystagmus. No melinda disconjugate gaze. V1-3 intact LT, No melinda facial asymmetry b/l. Hearing grossly normal b/l. Tongue midline.     Motor - Normal bulk and tone throughout. No pronator drift.    L/R         Deltoid  5/5    Biceps   5/5      Triceps  5/5         Wrist Extension 5/5   Wrist Flexion  5/5      5/5   L/R         Hip Flexion  5/5      Knee Extension  5/5  Dorsiflexion  5/5      Plantar Flexion 5/5     Sensation: Decreased subjective touch to sensation, but still appreciates it, in LLE that is worse than LUE and face. Cortical: Extinction on DSS (neglect): none  Reflexes L/R:  Biceps(C5) 2/2  BR(C6) 2/2   Triceps(C7)  2/2 Patellar(L4)   3/2   Toes: mute  Coordination: No dysmetria to FTN b/l UE     LABORATORY:  CBC                       14.5   4.70  )-----------( 181      ( 06 Mar 2023 11:43 )             45.6     Chem 03-06    141  |  106  |  21  ----------------------------<  101<H>  4.4   |  23  |  1.49<H>    Ca    9.8      06 Mar 2023 11:43    TPro  7.5  /  Alb  4.5  /  TBili  0.8  /  DBili  x   /  AST  20  /  ALT  16  /  AlkPhos  63  03-06    LFTs LIVER FUNCTIONS - ( 06 Mar 2023 11:43 )  Alb: 4.5 g/dL / Pro: 7.5 g/dL / ALK PHOS: 63 U/L / ALT: 16 U/L / AST: 20 U/L / GGT: x           Coagulopathy PT/INR - ( 06 Mar 2023 11:43 )   PT: 11.6 sec;   INR: 1.01 ratio         PTT - ( 06 Mar 2023 11:43 )  PTT:34.2 sec  Lipid Panel 03-02 Chol 130 LDL -- HDL 52 Trig 55  A1c   Cardiac enzymes     U/A   CSF  Other    STUDIES & IMAGING: (EEG, CT, MR, U/S, TTE/NICK):     CT head no con 3/4/23:  Stable small right thalamocapsular intraparenchymal hematoma. Minimal   mass effect without midline shift.  No acute transcortical infarction, new intracranial hemorrhage,   hydrocephalus, or extra-axial fluid collections.    MR head w/w/o con 3/4/23:     Similar-appearing 5 to 6 mm acute right thalamocapsular parenchymal   hemorrhage and small surrounding edema, given differences in modality.      No underlying enhancement. No abnormal parenchymal or leptomeningeal   enhancement elsewhere in the brain.    The presence of hemorrhage limits evaluation for underlying restricted   diffusion. No abnormal restricted diffusion elsewhere in the brain.    Small foci of susceptibility artifact in the right thalamus (adjacent to   the acute hemorrhage) and left posterior frontal opercular region. These   may represent chronic microhemorrhages and/or tiny cavernomas.    CTA head/neck 3/2/23:   Mild luminal   irregularity of the proximal left posterior cerebral artery P1 and P2   segments without stenosis or occlusion. The right posterior cerebral   artery is markedly diminutive and irregular beyond the proximal P1   segment, possibly reflecting vasospasm, vasculitis, possible stenosis.     Neurology Consultation     HPI: Patient ALLISON is a 63 y/o RH male PMHx kidney cancer (now has one kidney), prior stroke with residual R eye blindness, HTN, recent admission to stroke service for left face LUE, LLE numbness and heaviness found with small Rt thalamocapsular IPH and s/p cerebral angiogram 3/3/23 with no source for hemorrhage, who presents to ED for subjective acute on chronic worsening of L sided sensory symptoms. Patient accompanied by son and used [employer]  per preference ([id number] [facility]). They state patient on recent discharge had very mild LLE numbness predominantly. LKW yesterday 10:30 PM 3/5. Today AM woke up around 8AM 3/6 with notable subjective numbness of LLE up to LLQ of abdomen, LUE and L face particularly around ear region. Denies headaches, double vision, new vision changes, speech changes, word finding difficulty, worsening gait instability. Was started on aspirin for secondary stroke prevention due to history of prior ischemic infarct and R PCA stenosis. Did not take today 3/6 yet. At baseline has vision loss to even light in R eye per son. As per recent admission, the tentative plan was to obtain repeat neuroimaging in 4-6 weeks  to r/o any underlying tissue pathology once IPH would be resolved. VS during my evaluation, 98.4F, HR74, /72, RR18, 99%Ra.     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    Kidney stone    Gall stones    Hard of hearing  LEFT    H/O kidney removal    FAMILY HISTORY:    SOCIAL HISTORY:      MEDICATIONS (HOME):  Home Medications:    MEDICATIONS  (STANDING):    MEDICATIONS  (PRN):    ALLERGIES/INTOLERANCES:  Allergies  No Known Allergies    Intolerances    VITALS & EXAMINATION:  Vital Signs Last 24 Hrs  T(C): 36.8 (06 Mar 2023 11:42), Max: 36.8 (06 Mar 2023 10:49)  T(F): 98.3 (06 Mar 2023 11:42), Max: 98.3 (06 Mar 2023 11:42)  HR: 65 (06 Mar 2023 11:42) (65 - 69)  BP: 139/76 (06 Mar 2023 11:42) (139/76 - 174/80)  BP(mean): --  RR: 16 (06 Mar 2023 11:42) (16 - 16)  SpO2: 100% (06 Mar 2023 11:42) (100% - 100%)    Parameters below as of 06 Mar 2023 11:42  Patient On (Oxygen Delivery Method): room air     General:  Constitutional: Male, appears stated age, NAD   Head: Normocephalic; Eyes: clear sclera;   Extremities: No cyanosis; Skin: No melinda edema of LE  Resp: breathing comfortably     Neurological (>12):  MS: Awake, alert. Oriented person place situation. Follows commands. Attends to examiner  Language: Speech is hypophonic, clear, fluent, good repetition, comprehension, registration of words.  CNs: PERRL. VFF except testing R eye with vision loss. EOMI no nystagmus. No melinda disconjugate gaze. V1-3 intact LT, No melinda facial asymmetry b/l. Hearing grossly normal b/l. Tongue midline.     Motor - Normal bulk and tone throughout. No pronator drift.    L/R         Deltoid  5/5    Biceps   5/5      Triceps  5/5         Wrist Extension 5/5   Wrist Flexion  5/5      5/5   L/R         Hip Flexion  5/5      Knee Extension  5/5  Dorsiflexion  5/5      Plantar Flexion 5/5     Sensation: Decreased subjective touch to sensation, but still appreciates it, in LLE that is worse than LUE and face. Cortical: Extinction on DSS (neglect): none  Reflexes L/R:  Biceps(C5) 2/2  BR(C6) 2/2   Triceps(C7)  2/2 Patellar(L4)   3/2   Toes: mute  Coordination: No dysmetria to FTN b/l UE     LABORATORY:  CBC                       14.5   4.70  )-----------( 181      ( 06 Mar 2023 11:43 )             45.6     Chem 03-06    141  |  106  |  21  ----------------------------<  101<H>  4.4   |  23  |  1.49<H>    Ca    9.8      06 Mar 2023 11:43    TPro  7.5  /  Alb  4.5  /  TBili  0.8  /  DBili  x   /  AST  20  /  ALT  16  /  AlkPhos  63  03-06    LFTs LIVER FUNCTIONS - ( 06 Mar 2023 11:43 )  Alb: 4.5 g/dL / Pro: 7.5 g/dL / ALK PHOS: 63 U/L / ALT: 16 U/L / AST: 20 U/L / GGT: x           Coagulopathy PT/INR - ( 06 Mar 2023 11:43 )   PT: 11.6 sec;   INR: 1.01 ratio         PTT - ( 06 Mar 2023 11:43 )  PTT:34.2 sec  Lipid Panel 03-02 Chol 130 LDL -- HDL 52 Trig 55  A1c   Cardiac enzymes     U/A   CSF  Other    STUDIES & IMAGING: (EEG, CT, MR, U/S, TTE/NICK):     CT head no con 3/4/23:  Stable small right thalamocapsular intraparenchymal hematoma. Minimal   mass effect without midline shift.  No acute transcortical infarction, new intracranial hemorrhage,   hydrocephalus, or extra-axial fluid collections.    MR head w/w/o con 3/4/23:     Similar-appearing 5 to 6 mm acute right thalamocapsular parenchymal   hemorrhage and small surrounding edema, given differences in modality.    No underlying enhancement. No abnormal parenchymal or leptomeningeal   enhancement elsewhere in the brain.    The presence of hemorrhage limits evaluation for underlying restricted   diffusion. No abnormal restricted diffusion elsewhere in the brain.    Small foci of susceptibility artifact in the right thalamus (adjacent to   the acute hemorrhage) and left posterior frontal opercular region. These   may represent chronic microhemorrhages and/or tiny cavernomas.    CTA head/neck 3/2/23:   Mild luminal   irregularity of the proximal left posterior cerebral artery P1 and P2   segments without stenosis or occlusion. The right posterior cerebral   artery is markedly diminutive and irregular beyond the proximal P1   segment, possibly reflecting vasospasm, vasculitis, possible stenosis.

## 2023-03-06 NOTE — CONSULT NOTE ADULT - ASSESSMENT
Patient JW is a 63 y/o RH male PMHx kidney cancer (now has one kidney), prior stroke with residual R eye blindness, HTN, recent admission to stroke service for left face LUE, LLE numbness and heaviness found with small Rt thalamocapsular IPH and s/p cerebral angiogram 3/3/23 with no source for hemorrhage, who presents to ED for subjective acute on chronic worsening of L sided sensory symptoms. Patient accompanied by son and used Mandarin  per preference ([id number] [facility]). They state patient on recent discharge had very mild LLE numbness predominantly. LKW yesterday 10:30 PM 3/5. Today AM woke up around 8AM 3/6 with notable subjective numbness of LLE up to LLQ of abdomen, LUE and L face particularly around ear region. Denies headaches, double vision, new vision changes, speech changes, word finding difficulty, worsening gait instability. Was started on aspirin for secondary stroke prevention due to history of prior ischemic infarct and R PCA stenosis. Did not take today 3/6 yet. At baseline has vision loss to even light in R eye per son. As per recent admission, the tentative plan was to obtain repeat neuroimaging in 4-6 weeks  to r/o any underlying tissue pathology once IPH would be resolved.    NIHSS:  1 sensation   ICH: 0     Patient JW is a 61 y/o RH male PMHx kidney cancer (now has one kidney), prior stroke with residual R eye blindness, HTN, recent admission to stroke service for left face LUE, LLE numbness and heaviness found with small Rt thalamocapsular IPH and s/p cerebral angiogram 3/3/23 with no source for hemorrhage, who presents to ED for subjective acute on chronic worsening of L sided sensory symptoms.  Today AM woke up around 8AM 3/6 with notable subjective numbness of LLE up to LLQ of abdomen, LUE and L face particularly around ear region.  Was on aspirin for secondary stroke prevention due to history of prior ischemic infarct and R PCA stenosis.      NIHSS:  1 sensation   ICH: 0    CTH 3/6: Please note previously noted small area of high attenuation involving the right thalamic capsula region is again seen and likely compatible with hemorrhage. This finding measures approximately 0.9 x 0.7 cm.     Impression: Subjective acute on recent sensory numbness of notably LLE than LUE and face suspicious for fluctuations in neuro exam in setting of recently known Rt thalamocapsular IPH.      Recommendations:  [ ] Discussed with stroke fellow, okay for patient to be followed up closely in outpatient setting given neuro exam, history and repeat CT imaging in ED.    [ ] Continue aspirin 81mg daily for secondary stroke prevention, antihypertensives per recent discharge paperwork.   [ ] q4 neuro checks, vitals while here in ED  [ ] Repeat MRI brain w/ and w/o cont and MRA brain w/ con to look for underlying lesions, malformations in 4-6 weeks.   [ ] HgA1c 5.6,  LDL 66    [ ] atorvastatin       - fall precautions while here  - tele while here      Discussed with stroke fellow Dr Margarito Quintero and under supervision of attending Dr Maribeth Montemayor regarding above recommendations and plan for outpatient follow up.            Patient JW is a 63 y/o RH male PMHx kidney cancer (now has one kidney), prior stroke with residual R eye blindness, HTN, recent admission to stroke service for left face LUE, LLE numbness and heaviness found with small Rt thalamocapsular IPH and s/p cerebral angiogram 3/3/23 with no source for hemorrhage, who presents to ED for subjective acute on chronic worsening of L sided sensory symptoms.  Today AM woke up around 8AM 3/6 with notable subjective numbness of LLE up to LLQ of abdomen, LUE and L face particularly around ear region.  Was on aspirin for secondary stroke prevention due to history of prior ischemic infarct and R PCA stenosis.      NIHSS:  1 sensation   ICH: 0    CTH 3/6: Please note previously noted small area of high attenuation involving the right thalamic capsula region is again seen and likely compatible with hemorrhage. This finding measures approximately 0.9 x 0.7 cm.     Impression: Subjective acute on recent sensory numbness of notably LLE than LUE and face suspicious for fluctuations in neuro exam in setting of recently known Rt thalamocapsular IPH.      Recommendations:  [ ] Discussed with stroke fellow, plan for patient to be followed up closely in outpatient setting from neurovascular perspective given neuro exam, history and repeat CT imaging in ED. No further neurologic inpatient imaging recommended at this time based on current evaluation.   [ ] Non-neurologic causes and workup of above symptoms per ED  [ ] Continue aspirin 81mg daily for secondary stroke prevention, antihypertensives per recent discharge paperwork.   [ ] q4 neuro checks, vitals while here in ED  [ ] Repeat MRI brain w/ and w/o cont and MRA brain w/ con to look for underlying lesions, malformations in 4-6 weeks.   [ ] HgA1c 5.6,  LDL 66    [ ] atorvastatin       - fall precautions while here  - tele while here      Discussed with stroke fellow Dr Margarito Quintero and under supervision of attending Dr Maribeth Montemayor regarding above recommendations and plan for outpatient follow up.            Patient JW is a 63 y/o RH male PMHx kidney cancer (now has one kidney), prior stroke with residual R eye blindness, HTN, recent admission to stroke service for left face LUE, LLE numbness and heaviness found with small Rt thalamocapsular IPH and s/p cerebral angiogram 3/3/23 with no source for hemorrhage, who presents to ED for subjective acute on chronic worsening of L sided sensory symptoms.  Today AM woke up around 8AM 3/6 with notable subjective numbness of LLE up to LLQ of abdomen, LUE and L face particularly around ear region.  Was on aspirin for secondary stroke prevention due to history of prior ischemic infarct and R PCA stenosis.      NIHSS:  1 sensation   ICH: 0    CTH 3/6: Please note previously noted small area of high attenuation involving the right thalamic capsula region is again seen and likely compatible with hemorrhage. This finding measures approximately 0.9 x 0.7 cm.     Impression: Subjective acute on recent sensory numbness of notably LLE than LUE and face suspicious for fluctuations in neuro exam in setting of recently known Rt thalamocapsular IPH that is stable in appearance on current CT imaging.      Recommendations:  [ ] Discussed with stroke fellow, plan for patient to be followed up closely in outpatient setting from neurovascular perspective given neuro exam, history and repeat CT imaging in ED. No further neurologic inpatient imaging recommended at this time based on current evaluation.   [ ] Non-neurologic causes and workup of above symptoms per ED  [ ] Continue aspirin 81mg daily for secondary stroke prevention, antihypertensives per recent discharge paperwork.   [ ] q4 neuro checks, vitals while here in ED  [ ] Repeat MRI brain w/ and w/o cont and MRA brain w/ con to look for underlying lesions, malformations in 4-6 weeks.   [ ] HgA1c 5.6,  LDL 66    [ ] atorvastatin       - fall precautions while here  - tele while here      Discussed with stroke fellow Dr Margarito Quintero and under supervision of attending Dr Maribeth Montemayor regarding above recommendations and plan for outpatient follow up.

## 2023-03-06 NOTE — ED PROVIDER NOTE - ATTENDING CONTRIBUTION TO CARE
RGUJRAL 62-year-old male history listed brought in by his son for left sided numbness and tingling.  Patient had a recent stroke admission with similar presentation and had residual symptoms that he felt increased this morning.  Patient denies any headache change in vision any weakness.  Patient also noted some left-sided chest pain that resolved.  on exam, Patient is awake,alert,oriented x 3. Patient is well appearing and in no acute distress. Patient's chest is clear to ausculation, +s1s2. Abdomen is soft nd/nt +BS. Extremity with no swelling or calf tenderness. Pt is neuro intact. Patient has been compliant with his medications.  Check labs CT neuro consult and monitor.

## 2023-03-06 NOTE — ED PROVIDER NOTE - RAPID ASSESSMENT
Attending/MD Simon. 63 y/o RH male PMHx kidney cancer (now has one kidney), prior stroke (unknown residual deficits), HTN presenting with LUE, LLE numbness and heaviness, worsening, noticed this morning when he woke up, but pt started feeling the progression of his baseline symptoms, last night around 10p, no muscle weakness, or chest pain or sob. Son is translating, pt only wants to test the bleeding due to post stroke but know of the fact that no tPA or change in management even if new blockage, risk of increasing renal function outweighs the benefit taking iv contrast.    Patient was rapidly assessed via qdoc encounter; a limited history and physical exam was performed. The patient will be seen and further worked up in the main emergency department and their care will be completed by the main emergency department team. Receiving team will follow up on labs, analgesia, any clinical imaging, and perform reassessment and disposition of the patient as clinically indicated. All decisions regarding the progression of care will be made at their discretion.  -simon

## 2023-03-06 NOTE — ED PROVIDER NOTE - PHYSICAL EXAMINATION
VITALS:   T(C): 36.8 (03-06-23 @ 16:23), Max: 36.9 (03-06-23 @ 14:18)  HR: 75 (03-06-23 @ 16:23) (65 - 75)  BP: 122/81 (03-06-23 @ 16:23) (122/81 - 174/80)  RR: 16 (03-06-23 @ 16:23) (16 - 18)  SpO2: 99% (03-06-23 @ 16:23) (99% - 100%)    GENERAL: NAD, lying in bed comfortably  HEAD:  Atraumatic, Normocephalic  EYES: EOMI, PERRLA, conjunctiva and sclera clear  ENT: Moist mucous membranes  NECK: Supple, No JVD  CHEST/LUNG: Clear to auscultation bilaterally; No rales, rhonchi, wheezing, or rubs. Unlabored respirations  HEART: Regular rate and rhythm; No murmurs, rubs, or gallops  ABDOMEN: BSx4; Soft, nontender, nondistended  EXTREMITIES:  2+ Peripheral Pulses, brisk capillary refill. No clubbing, cyanosis, or edema  NERVOUS SYSTEM:  A&Ox3, no focal deficits, CNII-XII intact, strength 5/5 UE/LE b/l; +numbness L leg (thigh, calf, foot - dorsal/plantar/great toe surfaces), LUE, face V2/V3 and posterior-auricular distribution  SKIN: No rashes or lesions  Psych: Normal speech, normal behavior, normal affect

## 2023-03-06 NOTE — ED ADULT TRIAGE NOTE - CHIEF COMPLAINT QUOTE
had a stroke last wednesday; d/c from hospital saturday; symptoms getting worse, never went away; left side numbness for over a week

## 2023-03-06 NOTE — ED PROVIDER NOTE - OBJECTIVE STATEMENT
62M PMH 3 prior strokes (ischemic brain, ischemic eye, and most recently IPH hemorrhagic stroke d/c [facility] 3/4), kidney cancer, HTN now returning after d/c 3/4/23 for IPH for worsening L sided numbness/tingling. On d/c 3/4, pt's L sided numbness/tingling had almost completely resolved except for L calf numbness/tingling >> today whole LLE, L arm, and L V2/V3 and post-auricular area with numbness/tingling. Pt also notes a 1 hour episode of non-radiating substernal 5/10 CP this morning, resolved on its own. Pt was dx with IPH, bleed stable, started on amlodipine/ASA/statin, scheduled for repeat imaging in 4-6 weeks, d/c with SBP goal <140. Pt denies any fever/chills, HA/dizziness, vision changes, n/v/d, abdominal pain, fall, weakness in any extremitiy, gait changes, or any other sx's today.    Pt has been logging his BPs TID since d/c, log at bedside - SBP range 108-130 at home.

## 2023-03-06 NOTE — ED PROVIDER NOTE - CARE PLAN
1 Principal Discharge DX:	Numbness or tingling  Assessment and plan of treatment:	Continue with prior neurology reccs, SBP goal <140, c/w ASA, statin, amlodipine as prescribed.

## 2023-03-06 NOTE — ED PROVIDER NOTE - NSICDXPASTMEDICALHX_GEN_ALL_CORE_FT
PAST MEDICAL HISTORY:  Cancer of kidney     CVA (cerebral vascular accident)     Gall stones     Hard of hearing LEFT    Hypertension     Kidney stone     Profound vision loss of one eye right

## 2023-03-06 NOTE — ED PROVIDER NOTE - CARE PROVIDER_API CALL
Corazon Jaeger (MD)  Neurology; Vascular Neurology  [address], [address]  [place] [address]  Phone: [phone]  Fax: [phone]  Established Patient  Follow Up Time:     Amauri Skinner (DO)  Neurology; Vascular Neurology  [address], [address]  [place] [address]  Phone: [phone]  Fax: [phone]  Established Patient  Follow Up Time:

## 2023-03-06 NOTE — ED ADULT NURSE NOTE - OBJECTIVE STATEMENT
pt is a 63yo male PMH kidney CA, stroke, R eye blindness presenting to the ED complaining of numbness. pt mandarin speaking, son at bedside to translate per pt request, pt states he had a hem stroke last Thursday, began experiencing left sided numbness, pt was DCd saturday following stroke, still complaining of L leg numbness upon DC. pt states that he began feeling increased numbness in the L leg and left side of upper body yesterday, numbness did not improve overnight, pt told to return to ED by outpatient MD. pt currently endorsing L leg numbess, "tingling" sensation to the L side of abdomen, L arm, and L side of face, pt states his left leg "feels heavy", pt states he is able to ambulate however has difficulty bearing weight due to numbness. pt A&Ox3 gross neuro intact, no difficulty speaking in complete sentences, s1s2 heart sounds heard, pulses x 4, gunn x4, abdomen soft nontender nondistended, skin intact. pt denies chest pain, sob, ha, n/v/d, abdominal pain, f/c, urinary symptoms, hematuria.

## 2023-03-06 NOTE — CONSULT NOTE ADULT - CONSULT REASON
acute on chronic worsening of L sided symptoms acute on chronic subjective worsening of L sided symptoms

## 2023-03-06 NOTE — ED PROVIDER NOTE - NSFOLLOWUPINSTRUCTIONS_ED_ALL_ED_FT
You were evaluated by neurology for your recurrent L sided symptoms after recent admission for a brain bleed. You were re-examined and a repeat CT did not show any new acute bleed or change from your prior imaging. You were evaluated by our neurology team, and are advised to follow up with neurology as previously scheduled in 4-6 weeks. Please continue your aspirin, statin, and amlodipine as prescribed with a blood pressure goal of systolic <140 mmHg. If you experience worsening of your symptoms, sudden vision change, new focal weakness (can't move arm/leg), sudden severe headache, or any other symptoms you are concerned about - please return to the ED for evaluation.

## 2023-03-06 NOTE — ED PROVIDER NOTE - PATIENT PORTAL LINK FT
You can access the FollowMyHealth Patient Portal offered by [facility] by registering at the following website: [email or web address]. By joining [email or web address]’s FollowMyHealth portal, you will also be able to view your health information using other applications (apps) compatible with our system.

## 2023-03-07 PROBLEM — Z00.00 ENCOUNTER FOR PREVENTIVE HEALTH EXAMINATION: Status: ACTIVE | Noted: 2023-03-07

## 2023-03-21 ENCOUNTER — NON-APPOINTMENT (OUTPATIENT)
Age: 63
End: 2023-03-21

## 2023-03-21 ENCOUNTER — APPOINTMENT (OUTPATIENT)
Dept: NEUROSURGERY | Facility: CLINIC | Age: 63
End: 2023-03-21
Payer: COMMERCIAL

## 2023-03-21 ENCOUNTER — TRANSCRIPTION ENCOUNTER (OUTPATIENT)
Age: 63
End: 2023-03-21

## 2023-03-21 VITALS
SYSTOLIC BLOOD PRESSURE: 132 MMHG | HEIGHT: 65 IN | DIASTOLIC BLOOD PRESSURE: 76 MMHG | WEIGHT: 125 LBS | BODY MASS INDEX: 20.83 KG/M2 | HEART RATE: 81 BPM | OXYGEN SATURATION: 98 %

## 2023-03-21 PROCEDURE — 99215 OFFICE O/P EST HI 40 MIN: CPT

## 2023-03-21 RX ORDER — ATORVASTATIN CALCIUM 10 MG/1
10 TABLET, FILM COATED ORAL
Refills: 0 | Status: ACTIVE | COMMUNITY

## 2023-03-22 ENCOUNTER — NON-APPOINTMENT (OUTPATIENT)
Age: 63
End: 2023-03-22

## 2023-03-22 NOTE — REVIEW OF SYSTEMS
[As Noted in HPI] : as noted in HPI [Numbness] : numbness [Negative] : Musculoskeletal [Confused or Disoriented] : no confusion [Facial Weakness] : no facial weakness [Arm Weakness] : no arm weakness [Hand Weakness] : no hand weakness [Leg Weakness] : no leg weakness [Poor Coordination] : good coordination [Difficulties in Speech] : no speech difficulties [Seizures] : no convulsions [Difficulty Walking] : no difficulty walking [Ataxia] : no ataxia [Frequent Falls] : not falling [Abdominal Pain] : no abdominal pain [Vomiting] : no vomiting [Diarrhea] : no diarrhea [Incontinence] : no incontinence [Easy Bleeding] : no tendency for easy bleeding [Skin Lesions] : no skin lesions [Easy Bruising] : no tendency for easy bruising

## 2023-03-22 NOTE — END OF VISIT
[Time Spent: ___ minutes] : I have spent [unfilled] minutes of time on the encounter. [FreeTextEntry3] : Impression: Left posterior limb internal capsule thalamic hemorrhage. Etiology unclear. Angiography during hospital stay showed no evidence of vasculitis or vascular malformation. Reports prior ischemics strokes  diagnosed at [facility] were related to chemotherapy medication due to renal cell carcinoma. Apparent aple investigation was done at [place] including prolongued cardiac monitoring to exclude atrial fibrillation. \par As for the etiology of recent hemorrhage can not exclude cavernoma although no DVA at cerebral angiogram and MRI did not show a definite lesion. Ischemic stroke with hemorrhagic transformation is also to consider and less likely underlying malignancy.

## 2023-03-22 NOTE — HISTORY OF PRESENT ILLNESS
[FreeTextEntry1] : 63 yo male s/p 3/2-3/4/23 R [facility] arrives for an initial HFU today.\par 63 yo male with PMH of kidney Ca (has one kidney), prior stroke (unknown deficits), HTN, presented on 3/2/23 with LUE/LLE numbness and heaviness.   Reports LUE/LLE numbness has worsened since d/c from hospital and evaluated in ED which persists.  He denies pain, and he denies any impact on mobility, ambulating independently.\par \par Prior strokes history in [place] - R ophthalmic artery and Right posterior cerebral artery\par \par Copied from Hospital Course: \par Discharge Date	04-Mar-2023 \par Admission Date	02-Mar-2023 02:10 \par Reason for Admission	IP \par Hospital Course	 \par 63 y/o RH male PMHx kidney cancer (now has one kidney), prior stroke (unknown \par residual deficits), HTN presenting with left temporal, LUE, LLE numbness and \par heaviness, LKW 3/2/23 00;00. Sx started abruptly. Patient denies HA, vision \par changes, dizziness. Patient visiting from [place]. Takes ASA 81 mg daily and \par Lipitor 10 mg daily. \par \par Imaging \par CTH (03/04/23): Stable small right thalamocapsular intraparenchymal hematoma. \par Minimal  mass effect without midline shift. \par \par Cerebral angiogram (03/03/23): right thalamo-capsular parenchymal hemorrhage \par no source for hemorrhage \par \par CTH (03/02/23): High attenuation focus in the distal limb of the right internal \par capsule measuring slightly larger compared with earlier the same day. \par \par MRI HEAD W/ contrast 03/02/23:: Similar-appearing 5 to 6 mm acute right \par thalamocapsular parenchymal hemorrhage and small surrounding edema, given \par differences in modality. No underlying enhancement. No abnormal parenchymal or \par leptomeningeal enhancement elsewhere in the brain. The presence of hemorrhage \par limits evaluation for underlying restricted diffusion. No abnormal restricted \par diffusion elsewhere in the brain. \par Small foci of susceptibility artifact in the right thalamus (adjacent to the \par acute hemorrhage) and left posterior frontal opercular region. These may \par represent chronic microhemorrhages and/or tiny cavernomas. \par \par CT HEAD (03/02/23: Hyperdensity within the right posterior limb of the internal \par capsule suggestive of acute intraparenchymal hemorrhage. No significant mass \par effect or midline shift. Punctate faint hyperattenuation in the anterior \par frontal region may  reflect tiny mineralization, minimal hemorrhage, or \par underlying lesion such as cavernous malformation. \par \par CT angiography neck (03/02/23): No hemodynamically significant stenosis by \par NASCET criteria. No vascular dissection. \par \par CT angiography brain (03/02/23): Right posterior cerebral artery markedly \par diminutive and irregular possibly reflecting vasospasm, vasculitis, or possible \par stenosis. Mild luminal irregularity of the left proximal posterior \par cerebral artery. Otherwise no major vessel occlusion or proximal stenosis. No \par evidence of aneurysm or other vascular malformation. \par \par CT perfusion (03/02/23): No core infarct or at risk territory. \par \par \par Impression: Right thalamocapsular parenchymal hemorrhage etiology chronic HTN \par vs underlying malformation \par \par Pt was started on ASA for secondary stroke prevention due to hx of prior \par ischemic infarct and Right posterior cerebral artery stenosis \par \par Pt instructed to continue fluid intake due to elevated BUN post contrast \par \par Pt was evaluated by Hematology due to hx of renal cancer: Recommendations for \par outpatient follow up and PET/CT scan. \par \par Pt was evaluated by PT/OT with recommendations for d/c home with oupt PT, no \par need for OT services \par \par Pt medically stable for d/c home. Plan goals of care discussed wit pt's son and \par pt at bedside. \par \par Med Reconciliation: \par Recommended Post-Discharge VTE Prophylaxis	No post-discharge thromboprophylaxis \par indicated. \par Medication Reconciliation Status	Admission Reconciliation is Not Complete \par Discharge Reconciliation is Completed \par Discharge Medications	\par amLODIPine 2.5 mg oral tablet: 1 tab(s) orally once a day \par aspirin 81 mg oral tablet, chewable: 1 tab(s) orally once a day \par Lipitor 10 mg oral tablet: 1 tab(s) orally once a day \par \par occupational Therapy: occupational therapy: \par Dx: Right thalamocapsular IPH \par increase strength, balance, and endurance to improve all functional mobility. \par Physical therapy: Physical  therapy: \par Dx: Right thalamocapsular IPH \par increase strength, balance, and endurance to improve all functional mobility. \par Rolling walker: Rolling Walker \par Dx: Right thalamocapsular IPH \par \par \par Follow Up: \par Care Providers for Follow up (PCP/Outpatient Provider)	Corazon Jaeger) \par Neurology; Vascular Neurology \par [address] \[place] [address] \par Phone: [phone] \par Fax: [phone] \par Follow Up Time: \par \par Rosalina Miranda (DO) \par Hematology; Medical Oncology \par [address] \[place] [address] \par Phone: [phone] \par Fax: [phone] \par \par Rupert Das MD \par Cardiology; Internal Medicine \par [address], [address] \[place] [address] \par Phone: [phone] \par Fax: [phone] \par

## 2023-03-22 NOTE — ASSESSMENT
[FreeTextEntry1] : Impression: Right thalamocapsular parenchymal hemorrhage etiology chronic HTN vs underlying malformation \par \par Pt was started on ASA for secondary stroke prevention due to hx of prior ischemic infarct and Right posterior cerebral artery occlusion\par Pt was evaluated by Hematology due to hx of renal cancer: Recommendations for outpatient follow up and PET/CT\par scan.\par \par PLAN:\par MRI Brain w/wo - 6 weeks \par RTO 6 weeks\par Continue ASA 81 daily\par Hematology:  work up hx renal Ca for PET/CT\par Cardiology: stroke work up including ILR\par Dr. Palmer d/c BP med in hospital due to hypotension\par \par Patient has had extensive work up in [place] and considering returning for medical management and full work up with Cardiology,Nephrology, and Hematology.  He has medical insurance in [place] and prefers returning.\par \par \par Patient knows to call the office if there are any new or worsening symptoms.\par All questions and concerns answered and addressed in detail to patient's complete satisfaction.  Patient verbalized understanding and agreed to plan.\par \par Stroke prevention requires management of risk factors and patient education.  Risk factors include smoking, excess weight, hypertension, dyslipidemia, heavy alcohol use, physical inactivity, obesity and diabetes.  Blood pressure should be < 140/90.  Lipid lowering agents may reduce risk of stroke.   patient to maintain regular exercise and body weight and control intake of alcohol.\par \par  \par

## 2023-03-22 NOTE — PHYSICAL EXAM
[FreeTextEntry1] : Awake, alert, and oriented x 3. VSS. In no apparent distress.  \par Right eye blindness\par EOMI, no nystagmus, facial sensation intact symmetrically, face symmetrical, head turning and shoulder shrug symmetric and no tongue deviation with protrusion.  No pronator drift.   No past-pointing, no tremors noted, no dysdiadochokinesia, and finger to nose dysmetria was not present. \par Right hand dominant.\par Rises from a seated position in a comfortable fashion.\par \par Gait is well coordinated and stable without the use of an assistive device.  Motor strength in the upper extremities 5/5 in the biceps, triceps, and hand .  Motor strength in the lower extremities is 5/5 in the iliopsoas, quadriceps, and hamstrings.  \par \par

## 2023-03-22 NOTE — REASON FOR VISIT
[[employer] ] : provided by [employer]   [Interpreters_IDNumber] : [id number] Audrey Cabrera (Mandarin) [FreeTextEntry1] : initial HFU s/p 3/2-3/4/23 R IPH

## 2023-04-15 ENCOUNTER — APPOINTMENT (OUTPATIENT)
Dept: MRI IMAGING | Facility: CLINIC | Age: 63
End: 2023-04-15
Payer: SELF-PAY

## 2023-04-15 ENCOUNTER — OUTPATIENT (OUTPATIENT)
Dept: OUTPATIENT SERVICES | Facility: HOSPITAL | Age: 63
LOS: 1 days | End: 2023-04-15
Payer: SELF-PAY

## 2023-04-15 DIAGNOSIS — Z90.5 ACQUIRED ABSENCE OF KIDNEY: Chronic | ICD-10-CM

## 2023-04-15 DIAGNOSIS — I61.9 NONTRAUMATIC INTRACEREBRAL HEMORRHAGE, UNSPECIFIED: ICD-10-CM

## 2023-04-15 PROCEDURE — 70553 MRI BRAIN STEM W/O & W/DYE: CPT | Mod: 26

## 2023-04-15 PROCEDURE — 70553 MRI BRAIN STEM W/O & W/DYE: CPT

## 2023-04-15 PROCEDURE — A9585: CPT

## 2023-05-02 ENCOUNTER — APPOINTMENT (OUTPATIENT)
Dept: NEUROSURGERY | Facility: CLINIC | Age: 63
End: 2023-05-02
Payer: SELF-PAY

## 2023-05-02 VITALS
SYSTOLIC BLOOD PRESSURE: 143 MMHG | BODY MASS INDEX: 20.83 KG/M2 | OXYGEN SATURATION: 97 % | WEIGHT: 125 LBS | HEART RATE: 62 BPM | DIASTOLIC BLOOD PRESSURE: 83 MMHG | HEIGHT: 65 IN

## 2023-05-02 DIAGNOSIS — I61.9 NONTRAUMATIC INTRACEREBRAL HEMORRHAGE, UNSPECIFIED: ICD-10-CM

## 2023-05-02 DIAGNOSIS — Z78.9 OTHER SPECIFIED HEALTH STATUS: ICD-10-CM

## 2023-05-02 PROCEDURE — 99215 OFFICE O/P EST HI 40 MIN: CPT

## 2023-05-04 NOTE — END OF VISIT
[FreeTextEntry3] : Thalamic hemorrhage: The most common cause of thalamic hemorrhage is uncontrolled hypertension. As per son and patient he has never been diagnosed with high blood pressure. He has no other identifiable vascular risk factor other than age and history of previous ischemic  strokes. Etiology of previous strokes as per patient was related to the cancer therapy at the time. He also underwent cerebral angiogram in during hospital stay that showed no evidence of blood vessel malformation and his chronic right posterior cerebral artery occlusion. After follow up MR on 4/15/2023 I explained patient that findings are suggestive of underlying cavernoma or possible ischemic stroke with hemorrhagic transformation. \par  [Time Spent: ___ minutes] : I have spent [unfilled] minutes of time on the encounter.

## 2023-05-04 NOTE — REASON FOR VISIT
[[employer] ] : provided by [employer]   [Interpreters_IDNumber] : [id number] Carlene/ Chandu

## 2023-05-04 NOTE — HISTORY OF PRESENT ILLNESS
[FreeTextEntry1] : 5/2/23:  61 yo male s/p 3/2-3/4 R IP arrives for 3 month f/u with MRI for review.  He is feeling similar LLE numbness symptoms.\par 4/15/23 MRI Brain w/wo - PACS/NW shows chronic blood products and possible small underlying cavernoma within the Right thalamus.\par \par 3/21/23:  61 yo male s/p 3/2-3/4/23 R [facility] arrives for an initial HFU today.\par 61 yo male with PMH of kidney Ca (has one kidney), prior stroke (unknown deficits), HTN, presented on 3/2/23 with LUE/LLE numbness and heaviness.   Reports LUE/LLE numbness has worsened since d/c from hospital and evaluated in ED which persists.  He denies pain, and he denies any impact on mobility, ambulating independently.\par \par Prior strokes history in [place] - R ophthalmic artery and Right posterior cerebral artery\par \par Copied from Hospital Course: \par Discharge Date	04-Mar-2023 \par Admission Date	02-Mar-2023 02:10 \par Reason for Admission	IP \par Hospital Course	 \par 63 y/o RH male PMHx kidney cancer (now has one kidney), prior stroke (unknown \par residual deficits), HTN presenting with left temporal, LUE, LLE numbness and \par heaviness, LKW 3/2/23 00;00. Sx started abruptly. Patient denies HA, vision \par changes, dizziness. Patient visiting from [place]. Takes ASA 81 mg daily and \par Lipitor 10 mg daily. \par \par Imaging \par CTH (03/04/23): Stable small right thalamocapsular intraparenchymal hematoma. \par Minimal  mass effect without midline shift. \par \par Cerebral angiogram (03/03/23): right thalamo-capsular parenchymal hemorrhage \par no source for hemorrhage \par \par CTH (03/02/23): High attenuation focus in the distal limb of the right internal \par capsule measuring slightly larger compared with earlier the same day. \par \par MRI HEAD W/ contrast 03/02/23:: Similar-appearing 5 to 6 mm acute right \par thalamocapsular parenchymal hemorrhage and small surrounding edema, given \par differences in modality. No underlying enhancement. No abnormal parenchymal or \par leptomeningeal enhancement elsewhere in the brain. The presence of hemorrhage \par limits evaluation for underlying restricted diffusion. No abnormal restricted \par diffusion elsewhere in the brain. \par Small foci of susceptibility artifact in the right thalamus (adjacent to the \par acute hemorrhage) and left posterior frontal opercular region. These may \par represent chronic microhemorrhages and/or tiny cavernomas. \par \par CT HEAD (03/02/23: Hyperdensity within the right posterior limb of the internal \par capsule suggestive of acute intraparenchymal hemorrhage. No significant mass \par effect or midline shift. Punctate faint hyperattenuation in the anterior \par frontal region may  reflect tiny mineralization, minimal hemorrhage, or \par underlying lesion such as cavernous malformation. \par \par CT angiography neck (03/02/23): No hemodynamically significant stenosis by \par NASCET criteria. No vascular dissection. \par \par CT angiography brain (03/02/23): Right posterior cerebral artery markedly \par diminutive and irregular possibly reflecting vasospasm, vasculitis, or possible \par stenosis. Mild luminal irregularity of the left proximal posterior \par cerebral artery. Otherwise no major vessel occlusion or proximal stenosis. No \par evidence of aneurysm or other vascular malformation. \par \par CT perfusion (03/02/23): No core infarct or at risk territory. \par \par \par Impression: Right thalamocapsular parenchymal hemorrhage etiology chronic HTN \par vs underlying malformation \par \par Pt was started on ASA for secondary stroke prevention due to hx of prior \par ischemic infarct and Right posterior cerebral artery stenosis \par \par Pt instructed to continue fluid intake due to elevated BUN post contrast \par \par Pt was evaluated by Hematology due to hx of renal cancer: Recommendations for \par outpatient follow up and PET/CT scan. \par \par Pt was evaluated by PT/OT with recommendations for d/c home with oupt PT, no \par need for OT services \par \par Pt medically stable for d/c home. Plan goals of care discussed wit pt's son and \par pt at bedside. \par \par Med Reconciliation: \par Recommended Post-Discharge VTE Prophylaxis	No post-discharge thromboprophylaxis \par indicated. \par Medication Reconciliation Status	Admission Reconciliation is Not Complete \par Discharge Reconciliation is Completed \par Discharge Medications	\par amLODIPine 2.5 mg oral tablet: 1 tab(s) orally once a day \par aspirin 81 mg oral tablet, chewable: 1 tab(s) orally once a day \par Lipitor 10 mg oral tablet: 1 tab(s) orally once a day \par \par occupational Therapy: occupational therapy: \par Dx: Right thalamocapsular IPH \par increase strength, balance, and endurance to improve all functional mobility. \par Physical therapy: Physical  therapy: \par Dx: Right thalamocapsular IPH \par increase strength, balance, and endurance to improve all functional mobility. \par Rolling walker: Rolling Walker \par Dx: Right thalamocapsular IPH \par \par

## 2023-05-04 NOTE — ASSESSMENT
[FreeTextEntry1] : 63 yo male s/p 3/2/23 R IPH neurologically intact, chronic LLE numbness.  Occasional headaches do not feel are related.\par 4/15/23 MRI Brain w/wo in PACS/NW reviewed today\par Impression:  shows chronic blood products and possible small underlying cavernoma within the Right thalamus.\par \par \par Discussed no other identifiable risk factors to explain his symptoms. He will bring the MRI's from March and April to [place] for comparison with prior MRI's to closely evaluate if present previously.  \par \par \par PLAN:\par MRI Brain w/wo - as per team in [place] (likely in 1 year) \par f/u PRN \par Continue ASA 81 daily\par Hematology:  following up in [place] with provider and plan discussed in hospital consult \par Cardiology: stroke work up including ILR\par Dr. Palmer d/c BP med in hospital due to hypotension\par \par Patient has had extensive work up in [place] and is returning for medical management and full work up with Cardiology,Nephrology, and Hematology.  He has medical insurance in [place] and prefers returning.\par \par Patient knows to call the office if there are any new or worsening symptoms.\par All questions and concerns answered and addressed in detail to patient's complete satisfaction.  Patient verbalized understanding and agreed to plan.\par \par Stroke prevention requires management of risk factors and patient education.  Risk factors include smoking, excess weight, hypertension, dyslipidemia, heavy alcohol use, physical inactivity, obesity and diabetes.  Blood pressure should be < 140/90.  Lipid lowering agents may reduce risk of stroke.   patient to maintain regular exercise and body weight and control intake of alcohol.\par

## 2023-05-04 NOTE — REVIEW OF SYSTEMS
[As Noted in HPI] : as noted in HPI [Numbness] : numbness [Negative] : Musculoskeletal [Confused or Disoriented] : no confusion [Facial Weakness] : no facial weakness [Arm Weakness] : no arm weakness [Hand Weakness] : no hand weakness [Leg Weakness] : no leg weakness [Poor Coordination] : good coordination [Difficulties in Speech] : no speech difficulties [Seizures] : no convulsions [Dizziness] : no dizziness [Difficulty Walking] : no difficulty walking [Ataxia] : no ataxia [Frequent Falls] : not falling [Abdominal Pain] : no abdominal pain [Vomiting] : no vomiting [Diarrhea] : no diarrhea [Incontinence] : no incontinence [Skin Lesions] : no skin lesions [Easy Bleeding] : no tendency for easy bleeding [Easy Bruising] : no tendency for easy bruising [de-identified] : LLE numbness

## 2025-05-21 ENCOUNTER — APPOINTMENT (OUTPATIENT)
Dept: MRI IMAGING | Facility: CLINIC | Age: 65
End: 2025-05-21
Payer: SELF-PAY

## 2025-05-21 ENCOUNTER — OUTPATIENT (OUTPATIENT)
Dept: OUTPATIENT SERVICES | Facility: HOSPITAL | Age: 65
LOS: 1 days | End: 2025-05-21
Payer: SELF-PAY

## 2025-05-21 DIAGNOSIS — Z00.8 ENCOUNTER FOR OTHER GENERAL EXAMINATION: ICD-10-CM

## 2025-05-21 DIAGNOSIS — Z90.5 ACQUIRED ABSENCE OF KIDNEY: Chronic | ICD-10-CM

## 2025-05-21 PROCEDURE — 70551 MRI BRAIN STEM W/O DYE: CPT

## 2025-05-21 PROCEDURE — 70551 MRI BRAIN STEM W/O DYE: CPT | Mod: 26
